# Patient Record
Sex: FEMALE | Race: WHITE | NOT HISPANIC OR LATINO | URBAN - METROPOLITAN AREA
[De-identification: names, ages, dates, MRNs, and addresses within clinical notes are randomized per-mention and may not be internally consistent; named-entity substitution may affect disease eponyms.]

---

## 2021-05-28 ENCOUNTER — TRANSFERRED RECORDS (OUTPATIENT)
Dept: HEALTH INFORMATION MANAGEMENT | Facility: CLINIC | Age: 41
End: 2021-05-28

## 2022-03-04 ENCOUNTER — TRANSFERRED RECORDS (OUTPATIENT)
Dept: HEALTH INFORMATION MANAGEMENT | Facility: CLINIC | Age: 42
End: 2022-03-04

## 2022-07-14 ENCOUNTER — TRANSFERRED RECORDS (OUTPATIENT)
Dept: HEALTH INFORMATION MANAGEMENT | Facility: CLINIC | Age: 42
End: 2022-07-14

## 2022-10-19 ENCOUNTER — TRANSFERRED RECORDS (OUTPATIENT)
Dept: HEALTH INFORMATION MANAGEMENT | Facility: CLINIC | Age: 42
End: 2022-10-19

## 2022-10-26 ENCOUNTER — TRANSFERRED RECORDS (OUTPATIENT)
Dept: HEALTH INFORMATION MANAGEMENT | Facility: CLINIC | Age: 42
End: 2022-10-26

## 2022-11-29 ENCOUNTER — TRANSFERRED RECORDS (OUTPATIENT)
Dept: HEALTH INFORMATION MANAGEMENT | Facility: CLINIC | Age: 42
End: 2022-11-29

## 2023-04-05 ENCOUNTER — TRANSFERRED RECORDS (OUTPATIENT)
Dept: HEALTH INFORMATION MANAGEMENT | Facility: CLINIC | Age: 43
End: 2023-04-05

## 2023-09-11 ENCOUNTER — NURSE TRIAGE (OUTPATIENT)
Dept: FAMILY MEDICINE | Facility: CLINIC | Age: 43
End: 2023-09-11
Payer: OTHER GOVERNMENT

## 2023-09-11 NOTE — TELEPHONE ENCOUNTER
"Pt calling to schedule a visit for sudden, acute abdominal pain last night while riding new bike  LLQ pain was 8/10 at time  Tender to touch   Rest, heat and walking around when pt got home and resolved    Discomfort/sensation today is \"not really pain but a tickle in muscles, is maybe 1/10 or 2/10   Slight discomfort if poking at area  Pt states feels fine but would like to be seen to follow up on things     Hx of constipation, hysterectomy last year (for uterine pain and cysts) - at follow-up left ovary migrated slightly    No other sx at time  No trauma/injury to area, no change in bowel/bladder function, no fever, n/v, numbness, weakness, difficulty breathing, severe pain at time of call,  blood in stool or urine    Has est care visit 10/4 at [facility] but would like to be seen sooner - Routed to clinic to review and call patient back with availability today (per protocol)     Transferred call back to scheduling as well to check same day availability at [place] or surrounding clinics (per patient)    Reason for Disposition   Patient wants to be seen    Answer Assessment - Initial Assessment Questions  1. LOCATION: \"Where does it hurt?\"       LLQ   2. RADIATION: \"Does the pain shoot anywhere else?\" (e.g., chest, back)      Did not radiate - maybe a little toward midline   3. ONSET: \"When did the pain begin?\" (e.g., minutes, hours or days ago)       Last night while riding bike - lasted some time. Began improving a hr + later with heat, rest and walking around when got home from ride   4. SUDDEN: \"Gradual or sudden onset?\"      Sudden   5. PATTERN \"Does the pain come and go, or is it constant?\"     - If it comes and goes: \"How long does it last?\" \"Do you have pain now?\"      (Note: Comes and goes means the pain is intermittent. It goes away completely between bouts.)     - If constant: \"Is it getting better, staying the same, or getting worse?\"       (Note: Constant means the pain never goes away " "completely; most serious pain is constant and gets worse.)       Constant pain   6. SEVERITY: \"How bad is the pain?\"  (e.g., Scale 1-10; mild, moderate, or severe)     - MILD (1-3): Doesn't interfere with normal activities, abdomen soft and not tender to touch.      - MODERATE (4-7): Interferes with normal activities or awakens from sleep, abdomen tender to touch.      - SEVERE (8-10): Excruciating pain, doubled over, unable to do any normal activities.        Last night pain was severe but today very mild \"more like a tickle in my muscles - like I know there are there\" no real pain  7. RECURRENT SYMPTOM: \"Have you ever had this type of stomach pain before?\" If Yes, ask: \"When was the last time?\" and \"What happened that time?\"       no  8. CAUSE: \"What do you think is causing the stomach pain?\"      Unsure   9. RELIEVING/AGGRAVATING FACTORS: \"What makes it better or worse?\" (e.g., antacids, bending or twisting motion, bowel movement)      Felt better with heat, walking around a little and rest (lay down and watched football for a bit)  10. OTHER SYMPTOMS: \"Do you have any other symptoms?\" (e.g., back pain, diarrhea, fever, urination pain, vomiting)        No other sx   11. PREGNANCY: \"Is there any chance you are pregnant?\" \"When was your last menstrual period?\"        Hx hysterectomy last year    Protocols used: Abdominal Pain - Female-A-[place]  Nieves SOSA RN  [facility]    "

## 2023-09-11 NOTE — TELEPHONE ENCOUNTER
Called pt per below to schedule.  Pt reports symptoms have not changed or worsened since call earlier.    No available in person appts available in .  Offered appt on 9/11 with Morgan Moffett, pt declines.  Pt requests female provider.  Scheduled in [place] 9/12/23 with Kayla Silveira.      Advised pt to call back for new or worsening symptoms.  Advised pt could be seen in  today, pt declines and thinks she is fine to wait until appt on 9/12/23.  Pt says she is not sure she even needs to be seen but  wants her to get checked out.    Brigida Posada RN, BSN  [facility]

## 2023-09-12 ENCOUNTER — OFFICE VISIT (OUTPATIENT)
Dept: FAMILY MEDICINE | Facility: CLINIC | Age: 43
End: 2023-09-12
Payer: OTHER GOVERNMENT

## 2023-09-12 ENCOUNTER — TELEPHONE (OUTPATIENT)
Dept: FAMILY MEDICINE | Facility: CLINIC | Age: 43
End: 2023-09-12

## 2023-09-12 ENCOUNTER — HOSPITAL ENCOUNTER (OUTPATIENT)
Dept: CT IMAGING | Facility: CLINIC | Age: 43
Discharge: HOME | End: 2023-09-12
Attending: PHYSICIAN ASSISTANT | Admitting: PHYSICIAN ASSISTANT
Payer: OTHER GOVERNMENT

## 2023-09-12 ENCOUNTER — HOSPITAL ENCOUNTER (OUTPATIENT)
Dept: ULTRASOUND IMAGING | Facility: CLINIC | Age: 43
Discharge: HOME | End: 2023-09-12
Attending: PHYSICIAN ASSISTANT | Admitting: PHYSICIAN ASSISTANT
Payer: OTHER GOVERNMENT

## 2023-09-12 VITALS
DIASTOLIC BLOOD PRESSURE: 82 MMHG | TEMPERATURE: 97.9 F | SYSTOLIC BLOOD PRESSURE: 116 MMHG | OXYGEN SATURATION: 100 % | BODY MASS INDEX: 28.89 KG/M2 | RESPIRATION RATE: 12 BRPM | HEIGHT: 65 IN | HEART RATE: 99 BPM | WEIGHT: 173.4 LBS

## 2023-09-12 DIAGNOSIS — N83.202 CYST OF LEFT OVARY: ICD-10-CM

## 2023-09-12 DIAGNOSIS — R10.32 ABDOMINAL PAIN, LEFT LOWER QUADRANT: Primary | ICD-10-CM

## 2023-09-12 DIAGNOSIS — R10.32 ABDOMINAL PAIN, LEFT LOWER QUADRANT: ICD-10-CM

## 2023-09-12 PROBLEM — Z90.710 H/O VAGINAL HYSTERECTOMY: Status: ACTIVE | Noted: 2022-10-22

## 2023-09-12 PROBLEM — I10 HIGH BLOOD PRESSURE: Status: ACTIVE | Noted: 2019-04-01

## 2023-09-12 LAB
ALBUMIN UR-MCNC: NEGATIVE MG/DL
ANION GAP SERPL CALCULATED.3IONS-SCNC: 11 MMOL/L (ref 7–15)
APPEARANCE UR: CLEAR
BACTERIA #/AREA URNS HPF: ABNORMAL /HPF
BASOPHILS # BLD AUTO: 0 10E3/UL (ref 0–0.2)
BASOPHILS NFR BLD AUTO: 1 %
BILIRUB UR QL STRIP: NEGATIVE
BUN SERPL-MCNC: 9 MG/DL (ref 6–20)
CALCIUM SERPL-MCNC: 9.1 MG/DL (ref 8.6–10)
CHLORIDE SERPL-SCNC: 105 MMOL/L (ref 98–107)
COLOR UR AUTO: YELLOW
CREAT SERPL-MCNC: 0.8 MG/DL (ref 0.51–0.95)
DEPRECATED HCO3 PLAS-SCNC: 24 MMOL/L (ref 22–29)
EGFRCR SERPLBLD CKD-EPI 2021: >90 ML/MIN/1.73M2
EOSINOPHIL # BLD AUTO: 0.1 10E3/UL (ref 0–0.7)
EOSINOPHIL NFR BLD AUTO: 2 %
ERYTHROCYTE [DISTWIDTH] IN BLOOD BY AUTOMATED COUNT: 11.5 % (ref 10–15)
GLUCOSE SERPL-MCNC: 104 MG/DL (ref 70–99)
GLUCOSE UR STRIP-MCNC: NEGATIVE MG/DL
HCT VFR BLD AUTO: 43.8 % (ref 35–47)
HGB BLD-MCNC: 15.2 G/DL (ref 11.7–15.7)
HGB UR QL STRIP: ABNORMAL
IMM GRANULOCYTES # BLD: 0 10E3/UL
IMM GRANULOCYTES NFR BLD: 0 %
KETONES UR STRIP-MCNC: NEGATIVE MG/DL
LEUKOCYTE ESTERASE UR QL STRIP: NEGATIVE
LYMPHOCYTES # BLD AUTO: 1.8 10E3/UL (ref 0.8–5.3)
LYMPHOCYTES NFR BLD AUTO: 30 %
MCH RBC QN AUTO: 30.5 PG (ref 26.5–33)
MCHC RBC AUTO-ENTMCNC: 34.7 G/DL (ref 31.5–36.5)
MCV RBC AUTO: 88 FL (ref 78–100)
MONOCYTES # BLD AUTO: 0.5 10E3/UL (ref 0–1.3)
MONOCYTES NFR BLD AUTO: 8 %
MUCOUS THREADS #/AREA URNS LPF: PRESENT /LPF
NEUTROPHILS # BLD AUTO: 3.6 10E3/UL (ref 1.6–8.3)
NEUTROPHILS NFR BLD AUTO: 59 %
NITRATE UR QL: NEGATIVE
PH UR STRIP: 5.5 [PH] (ref 5–7)
PLATELET # BLD AUTO: 300 10E3/UL (ref 150–450)
POTASSIUM SERPL-SCNC: 4.6 MMOL/L (ref 3.4–5.3)
RBC # BLD AUTO: 4.98 10E6/UL (ref 3.8–5.2)
RBC #/AREA URNS AUTO: ABNORMAL /HPF
SODIUM SERPL-SCNC: 140 MMOL/L (ref 136–145)
SP GR UR STRIP: 1.02 (ref 1–1.03)
SQUAMOUS #/AREA URNS AUTO: ABNORMAL /LPF
UROBILINOGEN UR STRIP-ACNC: 0.2 E.U./DL
WBC # BLD AUTO: 6.1 10E3/UL (ref 4–11)
WBC #/AREA URNS AUTO: ABNORMAL /HPF

## 2023-09-12 PROCEDURE — 250N000011 HC RX IP 250 OP 636: Performed by: PHYSICIAN ASSISTANT

## 2023-09-12 PROCEDURE — 76830 TRANSVAGINAL US NON-OB: CPT

## 2023-09-12 PROCEDURE — 80048 BASIC METABOLIC PNL TOTAL CA: CPT | Performed by: PHYSICIAN ASSISTANT

## 2023-09-12 PROCEDURE — 36415 COLL VENOUS BLD VENIPUNCTURE: CPT | Performed by: PHYSICIAN ASSISTANT

## 2023-09-12 PROCEDURE — 74177 CT ABD & PELVIS W/CONTRAST: CPT

## 2023-09-12 PROCEDURE — 250N000009 HC RX 250: Performed by: PHYSICIAN ASSISTANT

## 2023-09-12 PROCEDURE — 85025 COMPLETE CBC W/AUTO DIFF WBC: CPT | Performed by: PHYSICIAN ASSISTANT

## 2023-09-12 PROCEDURE — 99203 OFFICE O/P NEW LOW 30 MIN: CPT | Performed by: PHYSICIAN ASSISTANT

## 2023-09-12 PROCEDURE — 81001 URINALYSIS AUTO W/SCOPE: CPT | Performed by: PHYSICIAN ASSISTANT

## 2023-09-12 RX ORDER — PSEUDOEPHEDRINE HCL 30 MG/1
30 TABLET, FILM COATED ORAL EVERY 6 HOURS PRN
COMMUNITY
Start: 2023-02-14 | End: 2023-09-25

## 2023-09-12 RX ORDER — DAPSONE 50 MG/G
GEL TOPICAL PRN
COMMUNITY
Start: 2023-01-25 | End: 2023-09-25

## 2023-09-12 RX ORDER — CLONIDINE HYDROCHLORIDE 0.2 MG/1
TABLET ORAL
COMMUNITY
Start: 2017-01-01 | End: 2024-02-05

## 2023-09-12 RX ORDER — IOPAMIDOL 755 MG/ML
120 INJECTION, SOLUTION INTRAVASCULAR ONCE
Status: COMPLETED | OUTPATIENT
Start: 2023-09-12 | End: 2023-09-12

## 2023-09-12 RX ADMIN — IOPAMIDOL 86 ML: 755 INJECTION, SOLUTION INTRAVENOUS at 14:27

## 2023-09-12 RX ADMIN — SODIUM CHLORIDE 52 ML: 9 INJECTION, SOLUTION INTRAVENOUS at 14:28

## 2023-09-12 ASSESSMENT — PAIN SCALES - GENERAL: PAINLEVEL: MILD PAIN (2)

## 2023-09-12 NOTE — TELEPHONE ENCOUNTER
"Please call patient and let her know that her CT shows a complex cyst of the left ovary with \"minimal surrounding stranding\" suggesting inflammation (likely the cause of her pain). No abscess. Further evaluation with ultrasound is indicated this was ordered stat. Can you help her get this scheduled as soon as possible?    Electrolytes and kidney function are normal.  "

## 2023-09-12 NOTE — TELEPHONE ENCOUNTER
"Called pt and relayed provider message below. Patient was given an opportunity to ask questions, verbalized understanding of plan, and is agreeable.    Provided phone number to [facility] radiology scheduling line- [phone]. Pt states \"I will give them a call now\"    Rowena FAITH RN    "

## 2023-09-12 NOTE — PROGRESS NOTES
Assessment & Plan     Abdominal pain, left lower quadrant  Further evaluation with labs and imaging as noted below. Labs did not show an obvious cause for symptoms. CT shows a complex ovarian cyst which is likely the cause of her pain. Further evaluation with ultrasound recommended. Ultrasound ordered. Next steps determined by this imaging.  - CT Abdomen Pelvis w Contrast; Future  - CBC with platelets and differential; Future  - Basic metabolic panel  (Ca, Cl, CO2, Creat, Gluc, K, Na, BUN); Future  - UA Macroscopic with reflex to Microscopic and Culture - Lab Collect; Future  - CBC with platelets and differential  - Basic metabolic panel  (Ca, Cl, CO2, Creat, Gluc, K, Na, BUN)  - UA Macroscopic with reflex to Microscopic and Culture - Lab Collect  - UA Microscopic with Reflex to Culture  - US Pelvic Complete with Transvaginal; Future    Cyst of left ovary      Review of the result(s) of each unique test - Imaging and labs as noted in this note  Ordering of each unique test  32 minutes spent by me on the date of the encounter doing chart review, history and exam, documentation and further activities per the note         Kayla Silveira PA-C  [facility]    Tyrell Huynh is a 43 year old, presenting for the following health issues:  Abdominal Pain (C/O pain in LLQ x 3 days, nausea/Recent hysterectomy -10 months ago)      9/12/2023     7:59 AM   Additional Questions   Roomed by Nydia   Accompanied by Self       History of Present Illness       Reason for visit:  Lower left quadrant abdominal pain  Symptom onset:  1-3 days ago  Symptoms include:  Pain in lower left quadrant, nausea, fatigue  Symptom intensity:  Moderate  Symptom progression:  Staying the same  Had these symptoms before:  No  What makes it worse:  Sitting  What makes it better:  Movement helps sometimes//heating pad    She eats 2-3 servings of fruits and vegetables daily.She consumes 1 sweetened beverage(s) daily.She  "exercises with enough effort to increase her heart rate 20 to 29 minutes per day.  She exercises with enough effort to increase her heart rate 5 days per week.   She is taking medications regularly.       Pain History:  When did you first notice your pain? 3 days   Have you seen anyone else for your pain? No  How has your pain affected your ability to work? Pain does not limit ability to work   What type of work do you or did you do?   Where in your body do you have pain? Abdominal/Flank Pain  Onset/Duration: 3 days  Description:   Character: ache  Location: left lower quadrant (with sitting pain goes more to the middle of the lower abdomen)  Radiation: None and middle abdomen sometimes  Woke with the pain in the left lower abdomen yesterday morning  Intensity: mild, 3/10 in severity   Progression of Symptoms:  constant  Accompanying Signs & Symptoms:  Fever/Chills: No  Gas/Bloating: No  Nausea: YES  Vomitting: No  Diarrhea: YES- mild  Constipation: No  Dysuria or Hematuria: No  History:   Trauma: No  Previous similar pain: No  Previous tests done: none  Precipitating factors:   Does the pain change with:     Food: YES- mild (worse)    Bowel Movement: No    Urination: No   Other factors:  N/A  Therapies tried and outcome: heating pad  No LMP recorded. Patient has had a hysterectomy. Following hysterectomy she had an abscess and was hospitalized (this was in [place] where she used to live). She does still have both ovaries and fallopian tubes.    The pain started in the lower abdomen, sharp in nature when biking in her neighborhood (2 days ago). Symptoms improved after 20-30 minutes.  8/10 in severity at its worst at that time.    Review of Systems   GENERAL:  No fevers  GI:  As noted in HPI        Objective    /82 (BP Location: Right arm, Patient Position: Sitting, Cuff Size: Adult Regular)   Pulse 99   Temp 97.9  F (36.6  C) (Oral)   Resp 12   Ht 1.651 m (5' 5\")   Wt 78.7 kg (173 lb " 6.4 oz)   SpO2 100%   BMI 28.86 kg/m    Body mass index is 28.86 kg/m .  Physical Exam   GENERAL: No acute distress  HEENT: Normocephalic,  CARDIAC: Regular rate and rhythm. No murmurs.  PULMONARY: Lungs are clear to auscultation bilaterally. No wheezes, rhonchi or crackles.  GI: Active bowel sounds, abdomen is soft. Tenderness in the left lower abdomen and suprapubic area. No rebound tenderness. No tenderness in the right lower abdomen.  : No CVA tenderness bilaterally.   NEURO: Alert and non-focal    CT ABDOMEN PELVIS W CONTRAST 9/12/2023 2:37 PM   IMPRESSION:   1.  Left adnexal cystic lesion which appears mildly complex with  minimal surrounding stranding. Recommend further evaluation with  pelvic ultrasound given patient's presentation of left lower quadrant  pain.  2.  Otherwise, no acute findings within the abdomen or pelvis.    Results for orders placed or performed in visit on 09/12/23 (from the past 24 hour(s))   CBC with platelets and differential    Narrative    The following orders were created for panel order CBC with platelets and differential.  Procedure                               Abnormality         Status                     ---------                               -----------         ------                     CBC with platelets and d...[[id number]]                      Final result                 Please view results for these tests on the individual orders.   Basic metabolic panel  (Ca, Cl, CO2, Creat, Gluc, K, Na, BUN)   Result Value Ref Range    Sodium 140 136 - 145 mmol/L    Potassium 4.6 3.4 - 5.3 mmol/L    Chloride 105 98 - 107 mmol/L    Carbon Dioxide (CO2) 24 22 - 29 mmol/L    Anion Gap 11 7 - 15 mmol/L    Urea Nitrogen 9.0 6.0 - 20.0 mg/dL    Creatinine 0.80 0.51 - 0.95 mg/dL    Calcium 9.1 8.6 - 10.0 mg/dL    Glucose 104 (H) 70 - 99 mg/dL    GFR Estimate >90 >60 mL/min/1.73m2   CBC with platelets and differential   Result Value Ref Range    WBC Count 6.1 4.0 - 11.0 10e3/uL    RBC  Count 4.98 3.80 - 5.20 10e6/uL    Hemoglobin 15.2 11.7 - 15.7 g/dL    Hematocrit 43.8 35.0 - 47.0 %    MCV 88 78 - 100 fL    MCH 30.5 26.5 - 33.0 pg    MCHC 34.7 31.5 - 36.5 g/dL    RDW 11.5 10.0 - 15.0 %    Platelet Count 300 150 - 450 10e3/uL    % Neutrophils 59 %    % Lymphocytes 30 %    % Monocytes 8 %    % Eosinophils 2 %    % Basophils 1 %    % Immature Granulocytes 0 %    Absolute Neutrophils 3.6 1.6 - 8.3 10e3/uL    Absolute Lymphocytes 1.8 0.8 - 5.3 10e3/uL    Absolute Monocytes 0.5 0.0 - 1.3 10e3/uL    Absolute Eosinophils 0.1 0.0 - 0.7 10e3/uL    Absolute Basophils 0.0 0.0 - 0.2 10e3/uL    Absolute Immature Granulocytes 0.0 <=0.4 10e3/uL   UA Macroscopic with reflex to Microscopic and Culture - Lab Collect    Specimen: Urine, Clean Catch   Result Value Ref Range    Color Urine Yellow Colorless, Straw, Light Yellow, Yellow    Appearance Urine Clear Clear    Glucose Urine Negative Negative mg/dL    Bilirubin Urine Negative Negative    Ketones Urine Negative Negative mg/dL    Specific Gravity Urine 1.025 1.003 - 1.035    Blood Urine Moderate (A) Negative    pH Urine 5.5 5.0 - 7.0    Protein Albumin Urine Negative Negative mg/dL    Urobilinogen Urine 0.2 0.2, 1.0 E.U./dL    Nitrite Urine Negative Negative    Leukocyte Esterase Urine Negative Negative   UA Microscopic with Reflex to Culture   Result Value Ref Range    Bacteria Urine Many (A) None Seen /HPF    RBC Urine 2-5 (A) 0-2 /HPF /HPF    WBC Urine 0-5 0-5 /HPF /HPF    Squamous Epithelials Urine Many (A) None Seen /LPF    Mucus Urine Present (A) None Seen /LPF    Narrative    Urine Culture not indicated

## 2023-09-13 ENCOUNTER — TELEPHONE (OUTPATIENT)
Dept: FAMILY MEDICINE | Facility: CLINIC | Age: 43
End: 2023-09-13
Payer: OTHER GOVERNMENT

## 2023-09-13 NOTE — TELEPHONE ENCOUNTER
Called [facility] and spoke to Bessie, .  U/S was done at [facility].  Need to call [facility] as that is provider who read the U/S per Bessie.  Transferred to Amando, [facility] [phone].  Amando sent note to Dr. Bartlett to look at again.  Dr. Bartlett comes in at 5 pm and will addend this evening.  Ruchi No RN

## 2023-09-13 NOTE — TELEPHONE ENCOUNTER
Please call radiology to have them review ultrasound of the pelvis again and compare to the CT also performed yesterday. They do not match up with the cystic mass noted in the left adnexal area while the ovaries are normal on the ultrasound.

## 2023-09-14 ENCOUNTER — TRANSFERRED RECORDS (OUTPATIENT)
Dept: HEALTH INFORMATION MANAGEMENT | Facility: CLINIC | Age: 43
End: 2023-09-14

## 2023-09-24 LAB
BASOPHILS # BLD AUTO: 0.1 10E3/UL (ref 0–0.2)
BASOPHILS NFR BLD AUTO: 1 %
EOSINOPHIL # BLD AUTO: 0.2 10E3/UL (ref 0–0.7)
EOSINOPHIL NFR BLD AUTO: 2 %
ERYTHROCYTE [DISTWIDTH] IN BLOOD BY AUTOMATED COUNT: 11.4 % (ref 10–15)
HCT VFR BLD AUTO: 42.9 % (ref 35–47)
HGB BLD-MCNC: 15 G/DL (ref 11.7–15.7)
IMM GRANULOCYTES # BLD: 0 10E3/UL
IMM GRANULOCYTES NFR BLD: 0 %
LYMPHOCYTES # BLD AUTO: 3.5 10E3/UL (ref 0.8–5.3)
LYMPHOCYTES NFR BLD AUTO: 36 %
MCH RBC QN AUTO: 30.8 PG (ref 26.5–33)
MCHC RBC AUTO-ENTMCNC: 35 G/DL (ref 31.5–36.5)
MCV RBC AUTO: 88 FL (ref 78–100)
MONOCYTES # BLD AUTO: 0.8 10E3/UL (ref 0–1.3)
MONOCYTES NFR BLD AUTO: 8 %
NEUTROPHILS # BLD AUTO: 5.1 10E3/UL (ref 1.6–8.3)
NEUTROPHILS NFR BLD AUTO: 53 %
NRBC # BLD AUTO: 0 10E3/UL
NRBC BLD AUTO-RTO: 0 /100
PLATELET # BLD AUTO: 352 10E3/UL (ref 150–450)
RBC # BLD AUTO: 4.87 10E6/UL (ref 3.8–5.2)
WBC # BLD AUTO: 9.7 10E3/UL (ref 4–11)

## 2023-09-24 PROCEDURE — 85025 COMPLETE CBC W/AUTO DIFF WBC: CPT | Performed by: EMERGENCY MEDICINE

## 2023-09-24 PROCEDURE — 36415 COLL VENOUS BLD VENIPUNCTURE: CPT | Performed by: EMERGENCY MEDICINE

## 2023-09-24 PROCEDURE — 80048 BASIC METABOLIC PNL TOTAL CA: CPT | Performed by: EMERGENCY MEDICINE

## 2023-09-24 PROCEDURE — 93005 ELECTROCARDIOGRAM TRACING: CPT

## 2023-09-24 PROCEDURE — 99285 EMERGENCY DEPT VISIT HI MDM: CPT | Mod: 25

## 2023-09-25 ENCOUNTER — APPOINTMENT (OUTPATIENT)
Dept: GENERAL RADIOLOGY | Facility: CLINIC | Age: 43
End: 2023-09-25
Attending: EMERGENCY MEDICINE
Payer: OTHER GOVERNMENT

## 2023-09-25 ENCOUNTER — HOSPITAL ENCOUNTER (EMERGENCY)
Facility: CLINIC | Age: 43
Discharge: HOME | End: 2023-09-25
Attending: EMERGENCY MEDICINE | Admitting: EMERGENCY MEDICINE
Payer: OTHER GOVERNMENT

## 2023-09-25 VITALS
TEMPERATURE: 97.6 F | DIASTOLIC BLOOD PRESSURE: 96 MMHG | SYSTOLIC BLOOD PRESSURE: 146 MMHG | RESPIRATION RATE: 20 BRPM | OXYGEN SATURATION: 100 % | HEART RATE: 91 BPM

## 2023-09-25 DIAGNOSIS — R53.83 OTHER FATIGUE: ICD-10-CM

## 2023-09-25 DIAGNOSIS — R00.2 PALPITATIONS: ICD-10-CM

## 2023-09-25 LAB
ANION GAP SERPL CALCULATED.3IONS-SCNC: 10 MMOL/L (ref 7–15)
ATRIAL RATE - MUSE: 97 BPM
BUN SERPL-MCNC: 9.4 MG/DL (ref 6–20)
CALCIUM SERPL-MCNC: 9.2 MG/DL (ref 8.6–10)
CHLORIDE SERPL-SCNC: 104 MMOL/L (ref 98–107)
CREAT SERPL-MCNC: 0.81 MG/DL (ref 0.51–0.95)
DEPRECATED HCO3 PLAS-SCNC: 24 MMOL/L (ref 22–29)
DIASTOLIC BLOOD PRESSURE - MUSE: NORMAL MMHG
EGFRCR SERPLBLD CKD-EPI 2021: >90 ML/MIN/1.73M2
FLUAV RNA SPEC QL NAA+PROBE: NEGATIVE
FLUBV RNA RESP QL NAA+PROBE: NEGATIVE
GLUCOSE SERPL-MCNC: 130 MG/DL (ref 70–99)
HOLD SPECIMEN: NORMAL
HOLD SPECIMEN: NORMAL
INTERPRETATION ECG - MUSE: NORMAL
P AXIS - MUSE: 58 DEGREES
POTASSIUM SERPL-SCNC: 4 MMOL/L (ref 3.4–5.3)
PR INTERVAL - MUSE: 148 MS
QRS DURATION - MUSE: 78 MS
QT - MUSE: 340 MS
QTC - MUSE: 431 MS
R AXIS - MUSE: 26 DEGREES
RSV RNA SPEC NAA+PROBE: NEGATIVE
SARS-COV-2 RNA RESP QL NAA+PROBE: NEGATIVE
SODIUM SERPL-SCNC: 138 MMOL/L (ref 136–145)
SYSTOLIC BLOOD PRESSURE - MUSE: NORMAL MMHG
T AXIS - MUSE: 52 DEGREES
TROPONIN T SERPL HS-MCNC: <6 NG/L
VENTRICULAR RATE- MUSE: 97 BPM

## 2023-09-25 PROCEDURE — 71046 X-RAY EXAM CHEST 2 VIEWS: CPT

## 2023-09-25 PROCEDURE — 87637 SARSCOV2&INF A&B&RSV AMP PRB: CPT | Performed by: EMERGENCY MEDICINE

## 2023-09-25 PROCEDURE — 36415 COLL VENOUS BLD VENIPUNCTURE: CPT | Performed by: EMERGENCY MEDICINE

## 2023-09-25 PROCEDURE — 84484 ASSAY OF TROPONIN QUANT: CPT | Performed by: EMERGENCY MEDICINE

## 2023-09-25 NOTE — DISCHARGE INSTRUCTIONS
You were seen in emergency department for fatigue and sensation of skipped beats.  Work-up here was reassuring from a cardiac perspective.  EKG showed normal electrical activity.  Your viral swabs were negative and your basic laboratory evaluation was reassuring.  I suspect viral syndrome as the underlying cause.  Certainly if you develop new or worsening conditions were always happy to reevaluate otherwise anticipate you doing well with primary care follow-up

## 2023-09-25 NOTE — ED PROVIDER NOTES
"  History     Chief Complaint:  Fatigue and skipped heartbeats    The history is provided by the patient.      Amina Roca is a 43 year old female with history of migraine who presents to the ED for evaluation. She reports shortness of breath and hr heart \"skipping a beat\" for the past three days. She tried to go grocery shopping but felt lightheadedness and thought she was going to pass out. When her heart is \"pounding\", she reports coughing and minimal diarrhea. She takes clonidine for migraines. Denies congestion.  No fever.  No vomiting.  No numbness or weakness.    Independent Historian:   None - Patient Only    Review of External Notes:   None     Medications:    cloNIDine (CATAPRES) 0.2 MG tablet, for ocular migraines    Past Medical History:    Migraine    Past Surgical History:    Hysterectomy     Physical Exam   Patient Vitals for the past 24 hrs:   BP Temp Temp src Pulse Resp SpO2   09/25/23 0200 146/96 -- -- -- 20 --   09/25/23 0157 -- -- -- -- -- 100 %   09/25/23 0156 -- -- -- -- -- 100 %   09/25/23 0155 -- -- -- 91 -- 90 %   09/24/23 2327 176/97 97.6  F (36.4  C) Temporal 119 20 100 %      Physical Exam  Nursing note and vitals reviewed.  Constitutional: Cooperative.  Sting comfortably  Cardiovascular: Normal rate, regular rhythm and normal heart sounds.  No murmur.  Pulmonary/Chest: Effort normal and breath sounds normal. No respiratory distress. No wheezes. No rales.   Abdominal: Soft. Normal appearance. There is no tenderness.   Neurological: Alert.  Oriented x3  Skin: Skin is warm and dry.  Psychiatric: Normal mood and affect.     Emergency Department Course   ECG  ECG results from 09/25/23   EKG 12 lead     Value    Systolic Blood Pressure     Diastolic Blood Pressure     Ventricular Rate 97    Atrial Rate 97    IL Interval 148    QRS Duration 78        QTc 431    P Axis 58    R AXIS 26    T Axis 52    Interpretation ECG      Sinus rhythm  Normal ECG     Imaging:  XR Chest 2 Views "   Final Result   IMPRESSION: No focal airspace consolidation. No pleural effusion or pneumothorax.      Cardiomediastinal silhouette is normal.         Report per radiology    Laboratory:  Labs Ordered and Resulted from Time of ED Arrival to Time of ED Departure   BASIC METABOLIC PANEL - Abnormal       Result Value    Sodium 138      Potassium 4.0      Chloride 104      Carbon Dioxide (CO2) 24      Anion Gap 10      Urea Nitrogen 9.4      Creatinine 0.81      Calcium 9.2      Glucose 130 (*)     GFR Estimate >90     TROPONIN T, HIGH SENSITIVITY - Normal    Troponin T, High Sensitivity <6     INFLUENZA A/B, RSV, & SARS-COV2 PCR - Normal    Influenza A PCR Negative      Influenza B PCR Negative      RSV PCR Negative      SARS CoV2 PCR Negative     CBC WITH PLATELETS AND DIFFERENTIAL    WBC Count 9.7      RBC Count 4.87      Hemoglobin 15.0      Hematocrit 42.9      MCV 88      MCH 30.8      MCHC 35.0      RDW 11.4      Platelet Count 352      % Neutrophils 53      % Lymphocytes 36      % Monocytes 8      % Eosinophils 2      % Basophils 1      % Immature Granulocytes 0      NRBCs per 100 WBC 0      Absolute Neutrophils 5.1      Absolute Lymphocytes 3.5      Absolute Monocytes 0.8      Absolute Eosinophils 0.2      Absolute Basophils 0.1      Absolute Immature Granulocytes 0.0      Absolute NRBCs 0.0         Interventions:  Medications - No data to display     Independent Interpretation (X-rays, CTs, rhythm strip):  None    Assessments/Consultations/Discussion of Management or Tests:   ED Course as of 23 0208   Mon Sep 25, 2023   0133 I obtained history and examined the patient as noted above.    0150 I rechecked the patient and explained findings. I prepared the patient to be discharged home.      Social Determinants of Health affecting care:   None    Disposition:  The patient was discharged to home.     Impression & Plan      Medical Decision Makin-year-old female who presents with palpitations described  as skipped beats as well as generalized fatigue.  Work-up here has been reassuring as detailed above.  EKG does not show any evidence of ischemia or ectopy or predisposition to malignant arrhythmia.  Based on her descriptions I suspect premature ventricular contractions and have had a good discussion with this with her.  Presenting blood pressure was elevated.  Without intervention this is improved though still mildly in the hypertensive range.  This can be followed up on an outpatient basis.  Viral swabs were negative though I suspect underlying viral process given the headache and fatigue.  This does not appear to represent an emergent medical process and she is stable for discharge.  Return precautions discussed    Diagnosis:    ICD-10-CM    1. Other fatigue  R53.83       2. Palpitations  R00.2          Scribe Disclosure:  I, Millicent Montanez, am serving as a scribe at 1:41 AM on 9/25/2023 to document services personally performed by Donald Law MD based on my observations and the provider's statements to me.   9/25/2023   Donald Law MD Amdahl, John, MD  09/25/23 0220

## 2023-09-25 NOTE — ED NOTES
"When RN was discharging pt and getting a fresh set of vitals, pt stated that she was in a hospital in [place] and they kept her for 12hrs after a blood pressure similar to the one this writer last got. RN explained to pt that the blood pressure significantly improved and that due to the fact that the pt was still not feeling well that can cause elevation in the blood pressure. Pt continued to seem concerned and stated to the pt that she can discuss the situation to the doctor, the pt stated, \"No it is fine if you say so.\" Rn continued to explain that she has discharged people before with elevated BPs so long as other factors such as troponin, EKG, other vitals and pt status were within normal limits. Pt said it was fine. Pt did leave the unit and RN spoke to Dr. Thanh MD did not seem concerned about the blood pressure.  "

## 2023-09-25 NOTE — ED TRIAGE NOTES
"Patient presents with fatigue and shortness of breath since Friday night, was at the grocery store and began feeling dizzy and short of breath with her heart \"pounding\".  Denies chest pain at present but feels fatigued and dizzy.          "

## 2023-09-27 ASSESSMENT — ENCOUNTER SYMPTOMS
COUGH: 1
ARTHRALGIAS: 0
NAUSEA: 0
EYE PAIN: 0
DYSURIA: 0
DIZZINESS: 1
BREAST MASS: 0
NERVOUS/ANXIOUS: 0
SHORTNESS OF BREATH: 1
SORE THROAT: 0
FREQUENCY: 0
PARESTHESIAS: 0
CONSTIPATION: 0
HEARTBURN: 0
HEMATURIA: 0
MYALGIAS: 0
HEMATOCHEZIA: 0
WEAKNESS: 0
JOINT SWELLING: 0
PALPITATIONS: 1
CHILLS: 1
HEADACHES: 1
FEVER: 0
DIARRHEA: 1
ABDOMINAL PAIN: 1

## 2023-10-04 ENCOUNTER — OFFICE VISIT (OUTPATIENT)
Dept: FAMILY MEDICINE | Facility: CLINIC | Age: 43
End: 2023-10-04
Payer: OTHER GOVERNMENT

## 2023-10-04 VITALS
SYSTOLIC BLOOD PRESSURE: 144 MMHG | WEIGHT: 176 LBS | DIASTOLIC BLOOD PRESSURE: 89 MMHG | OXYGEN SATURATION: 100 % | RESPIRATION RATE: 18 BRPM | TEMPERATURE: 98.3 F | HEIGHT: 65 IN | BODY MASS INDEX: 29.32 KG/M2 | HEART RATE: 94 BPM

## 2023-10-04 DIAGNOSIS — R22.32 AXILLARY LUMP, LEFT: ICD-10-CM

## 2023-10-04 DIAGNOSIS — R10.32 LEFT LOWER QUADRANT PAIN: Primary | ICD-10-CM

## 2023-10-04 DIAGNOSIS — R31.29 MICROSCOPIC HEMATURIA: ICD-10-CM

## 2023-10-04 DIAGNOSIS — Q23.81 BICUSPID AORTIC VALVE: ICD-10-CM

## 2023-10-04 DIAGNOSIS — R00.2 PALPITATIONS: ICD-10-CM

## 2023-10-04 DIAGNOSIS — I10 HYPERTENSION, UNSPECIFIED TYPE: ICD-10-CM

## 2023-10-04 LAB
ERYTHROCYTE [DISTWIDTH] IN BLOOD BY AUTOMATED COUNT: 11.5 % (ref 10–15)
HCT VFR BLD AUTO: 41.7 % (ref 35–47)
HGB BLD-MCNC: 14.5 G/DL (ref 11.7–15.7)
MCH RBC QN AUTO: 30.3 PG (ref 26.5–33)
MCHC RBC AUTO-ENTMCNC: 34.8 G/DL (ref 31.5–36.5)
MCV RBC AUTO: 87 FL (ref 78–100)
PLATELET # BLD AUTO: 282 10E3/UL (ref 150–450)
RBC # BLD AUTO: 4.78 10E6/UL (ref 3.8–5.2)
WBC # BLD AUTO: 11.4 10E3/UL (ref 4–11)

## 2023-10-04 PROCEDURE — 99214 OFFICE O/P EST MOD 30 MIN: CPT | Performed by: STUDENT IN AN ORGANIZED HEALTH CARE EDUCATION/TRAINING PROGRAM

## 2023-10-04 PROCEDURE — 85027 COMPLETE CBC AUTOMATED: CPT | Performed by: STUDENT IN AN ORGANIZED HEALTH CARE EDUCATION/TRAINING PROGRAM

## 2023-10-04 PROCEDURE — 86140 C-REACTIVE PROTEIN: CPT | Performed by: STUDENT IN AN ORGANIZED HEALTH CARE EDUCATION/TRAINING PROGRAM

## 2023-10-04 PROCEDURE — 36415 COLL VENOUS BLD VENIPUNCTURE: CPT | Performed by: STUDENT IN AN ORGANIZED HEALTH CARE EDUCATION/TRAINING PROGRAM

## 2023-10-04 PROCEDURE — 84443 ASSAY THYROID STIM HORMONE: CPT | Performed by: STUDENT IN AN ORGANIZED HEALTH CARE EDUCATION/TRAINING PROGRAM

## 2023-10-04 ASSESSMENT — PAIN SCALES - GENERAL: PAINLEVEL: MILD PAIN (2)

## 2023-10-04 NOTE — PROGRESS NOTES
"  Assessment & Plan     Left lower quadrant pain  Sxs started abruptly on 9/12/23 with severe diffuse lower abdominal pain that then localized to LLQ while mountain biking. Labs including CBC, BMP, UA unremarkable. Follow up CT abd/pelv revealing LLQ adnexal mass but subsequent US pelvis noting mass identified as normal appearing ovary. Symptoms persisting, about 1-2/10 constant LLQ, worsening with exercise, bending over but also associated with chills, eating, diarrhea which I can't explain. Plan to repeat CBC, CRP. If negative, likely MSK etiology and will obtain hip XR to start. Discussed red flag symptoms/signs that should prompt patient to be seen urgently.  - CBC with platelets  - CRP, inflammation    Palpitations  Seen in ED on 9/25/23 for palpitations. EKG and CBC unremarkable. Ongoing, daily since that time. Described as episodes of fluttering, not just sole skipped beats associated with momentary SOB. Plan to obtain TSH (CBC with normal hgb) along with 3 day zio patch.  - Adult Leadless EKG Monitor 3 to 7 Days  - TSH    Bicuspid aortic valve  Known bicuspid valve. Has not had echo obtained in many years. Plan to repeat echo today.  - Echocardiogram Complete    Axillary lump, left  - US Axillary Left  - MA Diagnostic Bilateral w/Jason    Microscopic hematuria  Noted on UA from 9/12/23. Needs repeat, ordered.    HTN  BP mildly elevated in clinic. Did not have time to address. Follow up at next visit.    Follow up in one month for annual physical    BMI:   Estimated body mass index is 29.29 kg/m  as calculated from the following:    Height as of this encounter: 1.651 m (5' 5\").    Weight as of this encounter: 79.8 kg (176 lb).     Andrew Kirk MD  [facility] [place]  10/9/2023    Tyrell Huynh is a 43 year old, presenting for the following health issues:  Physical and Establish Care      10/4/2023     3:17 PM   Additional Questions   Roomed by Jose REYNOLDS   Accompanied by No one "         10/4/2023     3:17 PM   Patient Reported Additional Medications   Patient reports taking the following new medications Multi vitamin       HPI     Abdominal pain  At first was all the way across the belly, then settled to just the left side.  Pain is in the LLQ, does move/radiate down the leg.   With pushing will radiate down leg and towards midline.  Exercise will also sort of make it worse and then will settle it down.  Beginning of month, was riding bike.  All of a sudden, had intense abdominal pain.  Went to see someone in the network and sent for a bunch of testing.  Found large cyst on ovary - saw ob/gyn - said to keep an eye on it.   Now 3-4 weeks out and still with similar pain.  Really activates when eating. It just burns.  Now it is constant. If pinching or bending, will kick it up.  Initially couldn't walk or eat or breath due to the pain.  Follow up with ob/gyn is in November.   Is not hungry, every time eating, it will hurt.   No fevers, does have nausea but no vomiting.  Does have chills off and on a couple times per day.   Off and on diarrhea, no blood. Does have this maybe once per day.   No alcohol use. Does take ibuprofen, tries not to take every day.     Palpitations  Started around 10 days ago. Noticed heart keep skipping beats.  Feels like it skips several beats, up to 3-4 beats.  Caused her to be short of breath momentarily.   No dizziness/lightheadedness, chest pain. No syncopal episodes.  Sensation just keeps happening. This is daily.   EKG was normal.   Does have bicuspid valve.   Is overall staying pretty stable.     Lump in L armpit  Had US of armpit in the past and mammogram.  This was WNL.   Seems to be getting a little bigger and seems to be radiating down along the side the breast and left arm now.     HTN  Is on clonidine for this and for migraines.        Objective    BP (!) 144/89 (BP Location: Right arm, Patient Position: Sitting, Cuff Size: Adult Regular)   Pulse 94    "Temp 98.3  F (36.8  C) (Oral)   Resp 18   Ht 1.651 m (5' 5\")   Wt 79.8 kg (176 lb)   SpO2 100%   BMI 29.29 kg/m    Body mass index is 29.29 kg/m .    Physical Exam   GENERAL: healthy, alert and no distress  HEAD: Normocephalic, atraumatic.   EYES: Normal conjunctivae, sclera.   ENT: Normal oropharynx.   RESP: lungs clear to auscultation - no rales, rhonchi or wheezes  CV: regular rate and rhythm, normal S1 S2, no murmur, click, rub or gallop. No peripheral swelling noted.   ABDOMEN: soft, mild TTP in LLQ, no rebound tenderness or guarding present. No hepatomegaly or masses appreciated. BS normactive.  MSK: no gross musculoskeletal defects noted. Hips: Log roll negative bilaterally. L Hip: Negative STELLA. Pain reproduced in LLQ with FADIR. Negative psoas sign.  SKIN: no suspicious lesions or rashes.  EXT: Warm and well perfused. DP pulses 2+ bilaterally.  NEURO: CNII-XII grossly intact. No focal deficits.  PSYCH: Groomed, dressed appropriately for weather. Normal mood with consistent affect.     Pain with internal rotation of hip. Pain with               "

## 2023-10-05 LAB — CRP SERPL-MCNC: 3.91 MG/L

## 2023-10-09 ENCOUNTER — LAB (OUTPATIENT)
Dept: LAB | Facility: CLINIC | Age: 43
End: 2023-10-09
Payer: OTHER GOVERNMENT

## 2023-10-09 DIAGNOSIS — R10.32 LEFT LOWER QUADRANT PAIN: ICD-10-CM

## 2023-10-09 DIAGNOSIS — R31.29 MICROSCOPIC HEMATURIA: ICD-10-CM

## 2023-10-09 LAB
ALBUMIN UR-MCNC: NEGATIVE MG/DL
AMORPH CRY #/AREA URNS HPF: ABNORMAL /HPF
APPEARANCE UR: CLEAR
BASO+EOS+MONOS # BLD AUTO: NORMAL 10*3/UL
BASO+EOS+MONOS NFR BLD AUTO: NORMAL %
BASOPHILS # BLD AUTO: 0 10E3/UL (ref 0–0.2)
BASOPHILS NFR BLD AUTO: 1 %
BILIRUB UR QL STRIP: NEGATIVE
COLOR UR AUTO: YELLOW
EOSINOPHIL # BLD AUTO: 0.1 10E3/UL (ref 0–0.7)
EOSINOPHIL NFR BLD AUTO: 2 %
ERYTHROCYTE [DISTWIDTH] IN BLOOD BY AUTOMATED COUNT: 11.5 % (ref 10–15)
GLUCOSE UR STRIP-MCNC: NEGATIVE MG/DL
HCT VFR BLD AUTO: 40.1 % (ref 35–47)
HGB BLD-MCNC: 14.1 G/DL (ref 11.7–15.7)
HGB UR QL STRIP: ABNORMAL
IMM GRANULOCYTES # BLD: 0 10E3/UL
IMM GRANULOCYTES NFR BLD: 0 %
KETONES UR STRIP-MCNC: NEGATIVE MG/DL
LEUKOCYTE ESTERASE UR QL STRIP: NEGATIVE
LYMPHOCYTES # BLD AUTO: 2.8 10E3/UL (ref 0.8–5.3)
LYMPHOCYTES NFR BLD AUTO: 36 %
MCH RBC QN AUTO: 30.8 PG (ref 26.5–33)
MCHC RBC AUTO-ENTMCNC: 35.2 G/DL (ref 31.5–36.5)
MCV RBC AUTO: 88 FL (ref 78–100)
MONOCYTES # BLD AUTO: 0.6 10E3/UL (ref 0–1.3)
MONOCYTES NFR BLD AUTO: 8 %
NEUTROPHILS # BLD AUTO: 4.3 10E3/UL (ref 1.6–8.3)
NEUTROPHILS NFR BLD AUTO: 55 %
NITRATE UR QL: NEGATIVE
PH UR STRIP: 7 [PH] (ref 5–7)
PLATELET # BLD AUTO: 311 10E3/UL (ref 150–450)
RBC # BLD AUTO: 4.58 10E6/UL (ref 3.8–5.2)
RBC #/AREA URNS AUTO: ABNORMAL /HPF
SP GR UR STRIP: 1.02 (ref 1–1.03)
SQUAMOUS #/AREA URNS AUTO: ABNORMAL /LPF
TSH SERPL DL<=0.005 MIU/L-ACNC: 1.07 UIU/ML (ref 0.3–4.2)
UROBILINOGEN UR STRIP-ACNC: 0.2 E.U./DL
WBC # BLD AUTO: 7.9 10E3/UL (ref 4–11)
WBC #/AREA URNS AUTO: ABNORMAL /HPF

## 2023-10-09 PROCEDURE — 36415 COLL VENOUS BLD VENIPUNCTURE: CPT

## 2023-10-09 PROCEDURE — 81001 URINALYSIS AUTO W/SCOPE: CPT

## 2023-10-09 PROCEDURE — 85025 COMPLETE CBC W/AUTO DIFF WBC: CPT

## 2023-10-10 DIAGNOSIS — M25.552 PAIN IN JOINT INVOLVING PELVIC REGION AND THIGH, LEFT: ICD-10-CM

## 2023-10-10 DIAGNOSIS — R10.32 LEFT LOWER QUADRANT PAIN: Primary | ICD-10-CM

## 2023-10-13 ENCOUNTER — ANCILLARY PROCEDURE (OUTPATIENT)
Dept: GENERAL RADIOLOGY | Facility: CLINIC | Age: 43
End: 2023-10-13
Attending: STUDENT IN AN ORGANIZED HEALTH CARE EDUCATION/TRAINING PROGRAM
Payer: OTHER GOVERNMENT

## 2023-10-13 DIAGNOSIS — M25.552 PAIN IN JOINT INVOLVING PELVIC REGION AND THIGH, LEFT: ICD-10-CM

## 2023-10-13 PROCEDURE — 73502 X-RAY EXAM HIP UNI 2-3 VIEWS: CPT | Mod: TC | Performed by: RADIOLOGY

## 2023-10-22 ENCOUNTER — HEALTH MAINTENANCE LETTER (OUTPATIENT)
Age: 43
End: 2023-10-22

## 2023-10-26 ENCOUNTER — TRANSFERRED RECORDS (OUTPATIENT)
Dept: HEALTH INFORMATION MANAGEMENT | Facility: CLINIC | Age: 43
End: 2023-10-26
Payer: OTHER GOVERNMENT

## 2023-10-26 ENCOUNTER — MYC MEDICAL ADVICE (OUTPATIENT)
Dept: FAMILY MEDICINE | Facility: CLINIC | Age: 43
End: 2023-10-26
Payer: OTHER GOVERNMENT

## 2023-10-27 NOTE — TELEPHONE ENCOUNTER
As patient seems to be ok now, please have her submit e-visit for allergy referral and epi pen prescription.    Thanks,    Andrew Kirk MD  [facility]  10/27/2023

## 2023-10-30 ENCOUNTER — HOSPITAL ENCOUNTER (OUTPATIENT)
Dept: CARDIOLOGY | Facility: CLINIC | Age: 43
Discharge: HOME | End: 2023-10-30
Attending: STUDENT IN AN ORGANIZED HEALTH CARE EDUCATION/TRAINING PROGRAM | Admitting: STUDENT IN AN ORGANIZED HEALTH CARE EDUCATION/TRAINING PROGRAM
Payer: OTHER GOVERNMENT

## 2023-10-30 ENCOUNTER — E-VISIT (OUTPATIENT)
Dept: FAMILY MEDICINE | Facility: CLINIC | Age: 43
End: 2023-10-30
Payer: OTHER GOVERNMENT

## 2023-10-30 DIAGNOSIS — Q23.81 BICUSPID AORTIC VALVE: ICD-10-CM

## 2023-10-30 DIAGNOSIS — T78.1XXA ALLERGIC REACTION TO PEANUT: Primary | ICD-10-CM

## 2023-10-30 LAB — LVEF ECHO: NORMAL

## 2023-10-30 PROCEDURE — 93306 TTE W/DOPPLER COMPLETE: CPT | Mod: 26 | Performed by: INTERNAL MEDICINE

## 2023-10-30 PROCEDURE — 93306 TTE W/DOPPLER COMPLETE: CPT

## 2023-10-30 PROCEDURE — 99422 OL DIG E/M SVC 11-20 MIN: CPT | Performed by: STUDENT IN AN ORGANIZED HEALTH CARE EDUCATION/TRAINING PROGRAM

## 2023-10-31 NOTE — TELEPHONE ENCOUNTER
Provider E-Visit time total (minutes): 13    Andrew Kirk MD  [facility]  11/1/2023

## 2023-11-01 RX ORDER — EPINEPHRINE 0.3 MG/.3ML
0.3 INJECTION SUBCUTANEOUS PRN
Qty: 2 EACH | Refills: 0 | Status: SHIPPED | OUTPATIENT
Start: 2023-11-01

## 2023-11-14 ENCOUNTER — OFFICE VISIT (OUTPATIENT)
Dept: FAMILY MEDICINE | Facility: CLINIC | Age: 43
End: 2023-11-14
Payer: OTHER GOVERNMENT

## 2023-11-14 ENCOUNTER — MYC MEDICAL ADVICE (OUTPATIENT)
Dept: FAMILY MEDICINE | Facility: CLINIC | Age: 43
End: 2023-11-14

## 2023-11-14 VITALS
OXYGEN SATURATION: 98 % | HEIGHT: 65 IN | RESPIRATION RATE: 16 BRPM | HEART RATE: 78 BPM | SYSTOLIC BLOOD PRESSURE: 152 MMHG | BODY MASS INDEX: 29.16 KG/M2 | WEIGHT: 175 LBS | TEMPERATURE: 98.5 F | DIASTOLIC BLOOD PRESSURE: 85 MMHG

## 2023-11-14 DIAGNOSIS — Q23.81 BICUSPID AORTIC VALVE: Primary | ICD-10-CM

## 2023-11-14 DIAGNOSIS — I71.21 ANEURYSM OF ASCENDING AORTA WITHOUT RUPTURE (H): ICD-10-CM

## 2023-11-14 DIAGNOSIS — I10 HYPERTENSION, UNSPECIFIED TYPE: ICD-10-CM

## 2023-11-14 DIAGNOSIS — R07.89 CHEST WALL PAIN: ICD-10-CM

## 2023-11-14 PROCEDURE — 99214 OFFICE O/P EST MOD 30 MIN: CPT | Performed by: STUDENT IN AN ORGANIZED HEALTH CARE EDUCATION/TRAINING PROGRAM

## 2023-11-14 RX ORDER — OLMESARTAN MEDOXOMIL 20 MG/1
20 TABLET ORAL DAILY
Qty: 30 TABLET | Refills: 0 | Status: SHIPPED | OUTPATIENT
Start: 2023-11-14 | End: 2023-11-27

## 2023-11-14 ASSESSMENT — PAIN SCALES - GENERAL: PAINLEVEL: MODERATE PAIN (4)

## 2023-11-14 NOTE — PROGRESS NOTES
"  Assessment & Plan     Bicuspid aortic valve  Aneurysm of ascending aorta without rupture (H24)  Hypertension, unspecified type  Noted on echo with borderline dilated TAA. Referral placed to cardiology. Does have uncontrolled HTN, unable to tolerate BB due to severe adverse suicidal ideation with plan thus will start ARB, see below. Plan to follow up in 2 weeks for repeat BP and BMP.   - olmesartan (BENICAR) 20 MG tablet  Dispense: 30 tablet; Refill: 0    Chest wall pain  Pain reproduced with palpation over chest wall. Do not think this is cardiac. Discussed with patient.     BMI:   Estimated body mass index is 29.12 kg/m  as calculated from the following:    Height as of this encounter: 1.651 m (5' 5\").    Weight as of this encounter: 79.4 kg (175 lb).     Follow up in 2 weeks as already scheduled    Andrew Kirk MD  [facility]  11/17/2023      Tyrell Huynh is a 43 year old, presenting for the following health issues:  follow up results        11/14/2023     9:14 AM   Additional Questions   Roomed by enrico salazar cma   Accompanied by self         11/14/2023     9:14 AM   Patient Reported Additional Medications   Patient reports taking the following new medications na     History of Present Illness       Reason for visit:  Follow up on medical testing    She eats 2-3 servings of fruits and vegetables daily.She consumes 1 sweetened beverage(s) daily.She exercises with enough effort to increase her heart rate 20 to 29 minutes per day.  She exercises with enough effort to increase her heart rate 3 or less days per week.   She is taking medications regularly.     Bicuspid results  Mom and her own son has bicuspid valve  Did make a follow up with Cardiology.  Back in January, noticed pulse was \"race-y\"  Wondering about her TAA results.  States she was on BB in past but side effects caused her to stop the medication.  She states her depression was significantly worsened and had a " "suicide plan.  She stopped taking the medication and her thoughts of suicide resolved    Migraine hx  BB tried first, stopped due to adverse effects.  Tried topamax instead as she had been on this previously for migraine control.   Was doing well until developed a large rash so this was also stopped.  Transitioned to clonidine which was helpful for migraine.   Had to stop during pregnancy with 3rd child though.  Never knew had high blood pressure until 3rd child pregnancy.  Developed pre-eclampsia during pregnancy.   Afterward delivery, BP never returned to normal and was placed back on clonidine.   This is still controlling migraines largely. Did have one breakthrough migraine.   Is on 0.2mg clonidine BID    Chest pain  Has been noting chest pain that has been persistent for the past several months.   Notes an aching in the left side of the chest. Does not worsen with exertion.   No associated symptoms of SOB, palpitations, jaw claudication, diaphoresis, arm tingling/numbness, n/v.   States that she presses of the musculature, pain is reproduced.         Objective    BP (!) 152/85   Pulse 78   Temp 98.5  F (36.9  C) (Oral)   Resp 16   Ht 1.651 m (5' 5\")   Wt 79.4 kg (175 lb)   SpO2 98%   BMI 29.12 kg/m    Body mass index is 29.12 kg/m .    Physical Exam   GENERAL: healthy, alert and no distress  HEAD: Normocephalic, atraumatic.   EYES: Normal conjunctivae, sclera.   NECK: Supple. No lymphadenopathy appreciated. Trachea midline. Thyroid not enlarged, not TTP.  RESP: lungs clear to auscultation - no rales, rhonchi or wheezes  CV: Reproduction of chest pain with pressure over L chest wall. Regular rate and rhythm, normal S1 S2, no murmur, click, rub or gallop. No carotid bruits. No peripheral swelling noted.   MSK: no gross musculoskeletal defects noted.  SKIN: no suspicious lesions or rashes.  EXT: Warm and well perfused.  NEURO: CNII-XII grossly intact. No focal deficits.  PSYCH: Groomed, dressed " appropriately for weather. Normal mood with consistent affect.

## 2023-11-15 ENCOUNTER — TELEPHONE (OUTPATIENT)
Dept: FAMILY MEDICINE | Facility: CLINIC | Age: 43
End: 2023-11-15
Payer: OTHER GOVERNMENT

## 2023-11-15 DIAGNOSIS — I10 HYPERTENSION, UNSPECIFIED TYPE: Primary | ICD-10-CM

## 2023-11-15 NOTE — TELEPHONE ENCOUNTER
Emory from [employer] calls about olmesartan.  Advised a prior auth was sent and denied.  See below.  Will forward to Dr. Kirk for advisal - see below for preferred drugs.

## 2023-11-15 NOTE — TELEPHONE ENCOUNTER
See telephone message from 11/15/23 - prior auth.  Prior auth for olemsartan was denied.  That message was sent to Dr. Kirk with the preferred medication.

## 2023-11-15 NOTE — TELEPHONE ENCOUNTER
Patient calling regarding rx for olmesartan. Patient's insurance is denying this medication because they want her to be on a beta blocker before prescribing this medication. Per patient, she has been on beta blockers before but they made her depressed so she cannot be on one. Please update prescription to insurance to include that the patient cannot be on a beta blocker so they can approve coverage of the olmesartan.

## 2023-11-15 NOTE — TELEPHONE ENCOUNTER
Central Prior Authorization Team   Phone: [phone]        PRIOR AUTHORIZATION DENIED    Medication: OLMESARTAN MEDOXOMIL PO  Insurance Company: [employer] - Phone [phone] Fax [phone]  Denial Date: 11/15/2023  Denial Rational:           Appeal Information:         Patient Notified: No Please note: Providers/Clinics are to notify the patients of the denial outcomes and steps going forward.

## 2023-11-15 NOTE — TELEPHONE ENCOUNTER
See my chart, patient states [facility] pharmacy did not receive the olmesartan rx   Per chart review this was sent on 11/14/23 and receipt confirmed on rx     RN placed call to [facility] spoke to pharmacist who verified that they do have the olmesartan rx, the concern is the insurance they have on file for patient is not current - responded back to patient via my chart     Mickie Regan Registered Nurse  [facility]

## 2023-11-16 ENCOUNTER — HOSPITAL ENCOUNTER (EMERGENCY)
Facility: CLINIC | Age: 43
Discharge: HOME | End: 2023-11-16
Attending: EMERGENCY MEDICINE | Admitting: EMERGENCY MEDICINE
Payer: OTHER GOVERNMENT

## 2023-11-16 ENCOUNTER — APPOINTMENT (OUTPATIENT)
Dept: CT IMAGING | Facility: CLINIC | Age: 43
End: 2023-11-16
Attending: EMERGENCY MEDICINE
Payer: OTHER GOVERNMENT

## 2023-11-16 VITALS
RESPIRATION RATE: 12 BRPM | WEIGHT: 176.37 LBS | BODY MASS INDEX: 29.35 KG/M2 | OXYGEN SATURATION: 99 % | DIASTOLIC BLOOD PRESSURE: 74 MMHG | HEART RATE: 94 BPM | TEMPERATURE: 98 F | SYSTOLIC BLOOD PRESSURE: 112 MMHG

## 2023-11-16 DIAGNOSIS — R07.9 CHEST PAIN, UNSPECIFIED TYPE: ICD-10-CM

## 2023-11-16 LAB
ALBUMIN SERPL BCG-MCNC: 4.3 G/DL (ref 3.5–5.2)
ALP SERPL-CCNC: 63 U/L (ref 40–150)
ALT SERPL W P-5'-P-CCNC: 31 U/L (ref 0–50)
ANION GAP SERPL CALCULATED.3IONS-SCNC: 11 MMOL/L (ref 7–15)
AST SERPL W P-5'-P-CCNC: 30 U/L (ref 0–45)
ATRIAL RATE - MUSE: 111 BPM
BASOPHILS # BLD AUTO: 0.1 10E3/UL (ref 0–0.2)
BASOPHILS NFR BLD AUTO: 1 %
BILIRUB SERPL-MCNC: 0.4 MG/DL
BUN SERPL-MCNC: 7.4 MG/DL (ref 6–20)
CALCIUM SERPL-MCNC: 9.2 MG/DL (ref 8.6–10)
CHLORIDE SERPL-SCNC: 103 MMOL/L (ref 98–107)
CREAT SERPL-MCNC: 0.79 MG/DL (ref 0.51–0.95)
DEPRECATED HCO3 PLAS-SCNC: 24 MMOL/L (ref 22–29)
DIASTOLIC BLOOD PRESSURE - MUSE: NORMAL MMHG
EGFRCR SERPLBLD CKD-EPI 2021: >90 ML/MIN/1.73M2
EOSINOPHIL # BLD AUTO: 0.1 10E3/UL (ref 0–0.7)
EOSINOPHIL NFR BLD AUTO: 1 %
ERYTHROCYTE [DISTWIDTH] IN BLOOD BY AUTOMATED COUNT: 11.8 % (ref 10–15)
GLUCOSE SERPL-MCNC: 104 MG/DL (ref 70–99)
HCT VFR BLD AUTO: 44.3 % (ref 35–47)
HGB BLD-MCNC: 15.4 G/DL (ref 11.7–15.7)
IMM GRANULOCYTES # BLD: 0 10E3/UL
IMM GRANULOCYTES NFR BLD: 0 %
INTERPRETATION ECG - MUSE: NORMAL
LYMPHOCYTES # BLD AUTO: 2.6 10E3/UL (ref 0.8–5.3)
LYMPHOCYTES NFR BLD AUTO: 30 %
MCH RBC QN AUTO: 30.7 PG (ref 26.5–33)
MCHC RBC AUTO-ENTMCNC: 34.8 G/DL (ref 31.5–36.5)
MCV RBC AUTO: 88 FL (ref 78–100)
MONOCYTES # BLD AUTO: 0.8 10E3/UL (ref 0–1.3)
MONOCYTES NFR BLD AUTO: 8 %
NEUTROPHILS # BLD AUTO: 5.3 10E3/UL (ref 1.6–8.3)
NEUTROPHILS NFR BLD AUTO: 60 %
NRBC # BLD AUTO: 0 10E3/UL
NRBC BLD AUTO-RTO: 0 /100
P AXIS - MUSE: 65 DEGREES
PLATELET # BLD AUTO: 346 10E3/UL (ref 150–450)
POTASSIUM SERPL-SCNC: 3.7 MMOL/L (ref 3.4–5.3)
PR INTERVAL - MUSE: 162 MS
PROT SERPL-MCNC: 7.3 G/DL (ref 6.4–8.3)
QRS DURATION - MUSE: 80 MS
QT - MUSE: 324 MS
QTC - MUSE: 440 MS
R AXIS - MUSE: 47 DEGREES
RBC # BLD AUTO: 5.01 10E6/UL (ref 3.8–5.2)
SODIUM SERPL-SCNC: 138 MMOL/L (ref 135–145)
SYSTOLIC BLOOD PRESSURE - MUSE: NORMAL MMHG
T AXIS - MUSE: 68 DEGREES
TROPONIN T SERPL HS-MCNC: <6 NG/L
TROPONIN T SERPL HS-MCNC: <6 NG/L
VENTRICULAR RATE- MUSE: 111 BPM
WBC # BLD AUTO: 8.9 10E3/UL (ref 4–11)

## 2023-11-16 PROCEDURE — 85025 COMPLETE CBC W/AUTO DIFF WBC: CPT | Performed by: EMERGENCY MEDICINE

## 2023-11-16 PROCEDURE — 250N000009 HC RX 250: Performed by: EMERGENCY MEDICINE

## 2023-11-16 PROCEDURE — 84484 ASSAY OF TROPONIN QUANT: CPT | Performed by: EMERGENCY MEDICINE

## 2023-11-16 PROCEDURE — 250N000011 HC RX IP 250 OP 636: Performed by: EMERGENCY MEDICINE

## 2023-11-16 PROCEDURE — 80053 COMPREHEN METABOLIC PANEL: CPT | Performed by: EMERGENCY MEDICINE

## 2023-11-16 PROCEDURE — 71275 CT ANGIOGRAPHY CHEST: CPT

## 2023-11-16 PROCEDURE — 93005 ELECTROCARDIOGRAM TRACING: CPT

## 2023-11-16 PROCEDURE — 36415 COLL VENOUS BLD VENIPUNCTURE: CPT | Performed by: EMERGENCY MEDICINE

## 2023-11-16 PROCEDURE — 99285 EMERGENCY DEPT VISIT HI MDM: CPT | Mod: 25

## 2023-11-16 RX ORDER — IOPAMIDOL 755 MG/ML
500 INJECTION, SOLUTION INTRAVASCULAR ONCE
Status: COMPLETED | OUTPATIENT
Start: 2023-11-16 | End: 2023-11-16

## 2023-11-16 RX ORDER — LOSARTAN POTASSIUM 50 MG/1
50 TABLET ORAL DAILY
Qty: 30 TABLET | Refills: 0 | Status: SHIPPED | OUTPATIENT
Start: 2023-11-16 | End: 2023-12-07

## 2023-11-16 RX ADMIN — SODIUM CHLORIDE 91 ML: 9 INJECTION, SOLUTION INTRAVENOUS at 11:35

## 2023-11-16 RX ADMIN — IOPAMIDOL 72 ML: 755 INJECTION, SOLUTION INTRAVENOUS at 11:35

## 2023-11-16 ASSESSMENT — ACTIVITIES OF DAILY LIVING (ADL)
ADLS_ACUITY_SCORE: 35
ADLS_ACUITY_SCORE: 33

## 2023-11-16 NOTE — TELEPHONE ENCOUNTER
"Continuing discussion in separate \"encounter\". See other telephone encounter from 11/15/23.     Andrew Kirk MD  [facility]  11/16/2023    "

## 2023-11-16 NOTE — TELEPHONE ENCOUNTER
"Will send alternative, covered ARB instead. Losartan, 50 mg sent to pharmacy.  Please let patient know this is in the same class as original prescription \"olmesartan\" with similar side effect profile.    Thanks,    Andrew Kirk MD  [facility]  11/16/2023    "

## 2023-11-16 NOTE — ED TRIAGE NOTES
Pt arrives with chest pain and SOB that started this morning. Recently diagnosed with aortic aneurism. Hx of hypertension. Heart rate in triage is ranging from 90-150s during assessment. Pt states numbness and tingling to lips, tongue, hands and feet that started on the way into the ER today.       Triage Assessment (Adult)       Row Name 11/16/23 1011          Triage Assessment    Airway WDL WDL        Respiratory WDL    Respiratory WDL X        Cardiac WDL    Cardiac WDL X        Chest Pain Assessment    Chest Pain Location anterior chest, left

## 2023-11-16 NOTE — DISCHARGE INSTRUCTIONS
Please make an appointment to follow up with your primary care provider in 2-3 days even if entirely better.    Discharge Instructions  Chest Pain    You have been seen today for chest pain or discomfort.  At this time, your provider has found no signs that your chest pain is due to a serious or life-threatening condition, (or you have declined more testing and/or admission to the hospital). However, sometimes there is a serious problem that does not show up right away. Your evaluation today may not be complete and you may need further testing and evaluation.     Generally, every Emergency Department visit should have a follow-up clinic visit with either a primary or a specialty clinic/provider. Please follow-up as instructed by your emergency provider today.  Return to the Emergency Department if:  Your chest pain changes, gets worse, starts to happen more often, or comes with less activity.  You are newly short of breath.  You get very weak or tired.  You pass out or faint.  You have any new symptoms, like fever, cough, numb legs, or you cough up blood.  You have anything else that worries you.    Until you follow-up with your regular provider, please do the following:  Take one aspirin daily unless you have an allergy or are told not to by your provider.  If a stress test appointment has been made, go to the appointment.  If you have questions, contact your regular provider.  Follow-up with your regular provider/clinic as directed; this is very important.    If you were given a prescription for medicine here today, be sure to read all of the information (including the package insert) that comes with your prescription.  This will include important information about the medicine, its side effects, and any warnings that you need to know about.  The pharmacist who fills the prescription can provide more information and answer questions you may have about the medicine.  If you have questions or concerns that the  pharmacist cannot address, please call or return to the Emergency Department.       Remember that you can always come back to the Emergency Department if you are not able to see your regular provider in the amount of time listed above, if you get any new symptoms, or if there is anything that worries you.

## 2023-11-16 NOTE — TELEPHONE ENCOUNTER
Called pt and advised of below from Dr Andrew Kirk.  Pt reports she failed 2 beta blockers.      Pt verbalized understanding and agreeable to plan, pt agrees to monitor closely for side effects and let us know if she has any concerns with the ARB.    Brigida Posada RN, BSN  [facility]

## 2023-11-16 NOTE — ED PROVIDER NOTES
History   Chief Complaint:  Chest Pain       HPI     Amina Roca is a 43 year old female who presents with chest pain. The patient started to have shortness of breath on Monday, followed by some chest pain at around 1 hour PTA, along with diaphoresis and increased pulse. The chest pain does not radiate anywhere, and nothing makes it better or worse. She has had some dizziness as well. She had some tingling and numbness in her extremities, along with some black spots in her vision. She denies DVT/PE history, hemoptysis, cancer history, recent travel, or hormone replacement therapy. She denies the use of stimulants, including caffeine and tobacco. She denies fevers, cough, runny nose, or nasal congestion.     ECHO 10/30  Procedure  Complete Echo Adult.  Interpretation Summary     Left ventricular systolic function is normal.  The visual ejection fraction is 60-65%.  No regional wall motion abnormalities noted.  The aortic valve is not well visualized. Possible bicuspid aortic valve with  small raphe between non and right coronary cusp.  Mild valvular aortic stenosis.  The mean AoV pressure gradient is 11mmHg.  The ascending aorta is Borderline dilated (4cm)  The study was technically adequate. There is no comparison study available.      Independent Historian:    None    Review of External Notes:  None    Medications:    clonidine  Epinephrine   losartan   olmesartan     Past Medical History:    Bicuspid aortic valve with ascending aorta 4.0 to 4.5 cm in diameter  Hypertension  Migraine    Past Surgical History:    Vaginal hysterectomy   Physical Exam   Patient Vitals for the past 24 hrs:   BP Temp Temp src Pulse Resp SpO2 Weight   11/16/23 1330 112/74 -- -- 94 12 99 % --   11/16/23 1302 107/80 -- -- 84 -- 100 % --   11/16/23 1232 127/76 -- -- 90 -- 100 % --   11/16/23 1133 (!) 140/89 -- -- 93 19 100 % --   11/16/23 1118 (!) 144/96 -- -- 94 -- 100 % --   11/16/23 1058 (!) 146/89 -- -- -- -- 100 % --   11/16/23  1053 -- -- -- 103 -- 100 % --   11/16/23 1012 (!) 153/94 98  F (36.7  C) Temporal (!) 134 18 100 % 80 kg (176 lb 5.9 oz)        Physical Exam      HEENT:    Oropharynx is moist  Eyes:    Conjunctiva normal  Neck:     Supple, no meningismus.     CV:     Tachycardic, regular rhythm.      No murmurs, rubs or gallops.       No unilateral leg swelling.       2+ radial pulses bilateral.       No lower extremity edema.  PULM:    Clear to auscultation bilateral.       No respiratory distress.      Good air exchange.     No rales or wheezing.     No stridor.  ABD:    Soft, non-tender, non-distended.       No pulsatile masses.       No rebound, guarding or rigidity.  MSK:     No gross deformity to all four extremities.   LYMPH:   No cervical lymphadenopathy.  NEURO:   Alert. Good muscle tone, no atrophy.  Skin:    Warm, dry and intact.    Psych:    Mood is good and affect is appropriate.      Emergency Department Course   ECG  ECG taken at 1018, ECG read at 1033  Sinus tachycardia  Otherwise normal ECG   Rate 111 bpm. IN interval 162 ms. QRS duration 80 ms. QT/QTc 324/440 ms. P-R-T axes 65 47 68.    Imaging:  CT Chest Pulmonary Embolism w Contrast   Preliminary Result   IMPRESSION: No convincing acute pulmonary embolus or acute finding in   the thorax.      Exercise Stress Echocardiogram    (Results Pending)     Report per radiology    Laboratory:  Labs Ordered and Resulted from Time of ED Arrival to Time of ED Departure   COMPREHENSIVE METABOLIC PANEL - Abnormal       Result Value    Sodium 138      Potassium 3.7      Carbon Dioxide (CO2) 24      Anion Gap 11      Urea Nitrogen 7.4      Creatinine 0.79      GFR Estimate >90      Calcium 9.2      Chloride 103      Glucose 104 (*)     Alkaline Phosphatase 63      AST 30      ALT 31      Protein Total 7.3      Albumin 4.3      Bilirubin Total 0.4     TROPONIN T, HIGH SENSITIVITY - Normal    Troponin T, High Sensitivity <6     TROPONIN T, HIGH SENSITIVITY - Normal    Troponin T,  High Sensitivity <6     CBC WITH PLATELETS AND DIFFERENTIAL    WBC Count 8.9      RBC Count 5.01      Hemoglobin 15.4      Hematocrit 44.3      MCV 88      MCH 30.7      MCHC 34.8      RDW 11.8      Platelet Count 346      % Neutrophils 60      % Lymphocytes 30      % Monocytes 8      % Eosinophils 1      % Basophils 1      % Immature Granulocytes 0      NRBCs per 100 WBC 0      Absolute Neutrophils 5.3      Absolute Lymphocytes 2.6      Absolute Monocytes 0.8      Absolute Eosinophils 0.1      Absolute Basophils 0.1      Absolute Immature Granulocytes 0.0      Absolute NRBCs 0.0       Emergency Department Course & Assessments:    Interventions:  Medications   CT SCAN FLUSH (91 mLs Intravenous $Given 23 1135)   iopamidol (ISOVUE-370) solution 500 mL (72 mLs Intravenous $Given 23 1135)      Assessments:  1025 I obtained history and examined patient.   1221 I rechecked the patient and explained findings.  1329 I rechecked the patient and explained findings.    Independent Interpretation (X-rays, CTs, rhythm strip):  Reviewed Patient's chest CT in which there is no large central pulmonary embolism    Consultations/Discussion of Management or Tests:  None    Social Determinants of Health affecting care:  None     Disposition:  The patient was discharged to home.   Impression & Plan    Medical Decision Makin-year-old female presents with atypical chest pain.  EKG is without ischemic changes nor dysrhythmia.  Due to the presenting tachycardia and reported aortic aneurysm noted on echo, I deferred D-dimer test and went straight to CT scan.  CT scan is without aortic dissection, significant aortic aneurysm or pulmonary embolism.  Initial troponin within normal limits.  Based on duration of symptoms, she underwent a 2-hour delta troponin in which troponin remains undetectable.  Differential diagnosis would include costochondritis, pleurisy, atypical reflux, esophageal spasm, anxiety.  Outpatient stress  test ordered.  Close follow-up with PCP.      Diagnosis:    ICD-10-CM    1. Chest pain, unspecified type  R07.9 Exercise Stress Echocardiogram           Discharge Medications:  New Prescriptions    No medications on file     Scribe Disclosure:  Aniyah PEREIRA, am serving as a scribe at 10:22 AM on 11/16/2023 to document services personally performed by Saturnino Vital MD based on my observations and the provider's statements to me.  11/16/2023   Saturnino Vital MD Matthews, Jeremiah R, MD  11/16/23 4061

## 2023-11-27 ENCOUNTER — OFFICE VISIT (OUTPATIENT)
Dept: FAMILY MEDICINE | Facility: CLINIC | Age: 43
End: 2023-11-27
Payer: OTHER GOVERNMENT

## 2023-11-27 VITALS
WEIGHT: 175 LBS | HEIGHT: 65 IN | SYSTOLIC BLOOD PRESSURE: 119 MMHG | HEART RATE: 78 BPM | BODY MASS INDEX: 29.16 KG/M2 | OXYGEN SATURATION: 100 % | RESPIRATION RATE: 12 BRPM | TEMPERATURE: 98.1 F | DIASTOLIC BLOOD PRESSURE: 77 MMHG

## 2023-11-27 DIAGNOSIS — K63.8219 SMALL INTESTINAL BACTERIAL OVERGROWTH (SIBO): ICD-10-CM

## 2023-11-27 DIAGNOSIS — R07.89 CHEST WALL PAIN: ICD-10-CM

## 2023-11-27 DIAGNOSIS — G43.909 MIGRAINE WITHOUT STATUS MIGRAINOSUS, NOT INTRACTABLE, UNSPECIFIED MIGRAINE TYPE: ICD-10-CM

## 2023-11-27 DIAGNOSIS — I10 HYPERTENSION, UNSPECIFIED TYPE: Primary | ICD-10-CM

## 2023-11-27 DIAGNOSIS — R10.32 LEFT LOWER QUADRANT PAIN: ICD-10-CM

## 2023-11-27 PROCEDURE — 99214 OFFICE O/P EST MOD 30 MIN: CPT | Performed by: STUDENT IN AN ORGANIZED HEALTH CARE EDUCATION/TRAINING PROGRAM

## 2023-11-27 RX ORDER — CLONIDINE 0.17 MG/1
0.34 TABLET, EXTENDED RELEASE ORAL DAILY
Qty: 60 TABLET | Refills: 0 | Status: SHIPPED | OUTPATIENT
Start: 2023-11-27 | End: 2023-12-28

## 2023-11-27 ASSESSMENT — PAIN SCALES - GENERAL: PAINLEVEL: NO PAIN (0)

## 2023-11-27 NOTE — PROGRESS NOTES
"  Assessment & Plan     Hypertension, unspecified type  Migraine without status migrainosus, not intractable, unspecified migraine type  Currently on clonidine IR for both HTN and migraine prophylaxis. Did not tolerate losartan. BP WNL in clinic today. Likely that HTN and rapid pulse are related to rebound clonidine symptoms. Plan to transition from IR to ER tablet.  - CloNIDine HCl 0.17 MG TB24  Dispense: 60 tablet; Refill: 0    Left lower quadrant pain  Small intestinal bacterial overgrowth (SIBO)  Unclear etiology of LLQ. Patient notes prior hx of SIBO and similar symptoms thus will refer to GI. Of note, symptoms do correlate with exercise and exam positive for FADIR thus if not improving/persistent, consider referral to sports medicine.   - Adult GI  Referral - Consult Only    Chest wall pain  Discussed conservative therapies including ice/heat, NSAIDs/tylenol and activity modification. Of note, was seen in ED recently for similar sxs along with SOB. Workup was negative and outpatient stress echo ordered but patient would prefer to see Cardiology first which seems very reasonable. Declines PT referral today.    BMI:   Estimated body mass index is 29.12 kg/m  as calculated from the following:    Height as of this encounter: 1.651 m (5' 5\").    Weight as of this encounter: 79.4 kg (175 lb).     Follow up in 3-6 months for annual physical    Andrew Kikr MD  [facility]  11/27/2023    Tyrell Huynh is a 43 year old, presenting for the following health issues:  Follow Up        11/27/2023    10:42 AM   Additional Questions   Roomed by Elis Abbott VF       HTN/rapid heart rate  Tried taking losartan for a couple of days but caused pulse to increase to 140's for a full 24 hours. This improved but then when she took the second dose, it increased again into the 140's. She also noted severe headache, seeing spots and irritability so stopped taking this.  Has noticed increase HR 4-5 " "hours after taking clonidine. This is typically when she notes elevated BP as well. BP and pulse tend to be normal for the first 1-3 hours of taking this. She recently gave up morning caffeine in the past month which seems to have helped decrease her heart rate as well. Notes that she took clonidine this morning about one hour prior to her appointment.     Chest pain  Still consistent/persistent. Worsens with pressing over the left chest.   Does have remote hx of outdoor parasol hitting her in the chest several years ago but never had any problems until now.    Abdominal pain  Since initial onset, hasn't had any return of acute attack.   Still noticing discomfort/pain all in the same area, in the LLQ. Sometimes radiating into medial thigh.   It is \"unhappy\" most of the time. More like an awareness that pain is present. Sometimes pinch-y or achy.   When she is up and walking more, pain is about 4-5 the next day and associated with increased diarrhea.   Also seems to be generally nauseated since this all started. No fevers. No other symptoms that have evolved since onset.    GI hx:  After last son was born, had pretty significant GI symptoms, had a bunch of labs tested, gallbladder, EGD, barium studies - all were normal. Then was finally tested for SIBO and this came back positive. Abx for 30 days, then probiotics for another 60 days. This seemed to improve symptoms. These symptoms are similar.    History of Present Illness       Hypertension: She presents for follow up of hypertension.  She does check blood pressure  regularly outside of the clinic. Outside blood pressures have been over 140/90. She follows a low salt diet.     She eats 2-3 servings of fruits and vegetables daily.She consumes 1 sweetened beverage(s) daily.She exercises with enough effort to increase her heart rate 20 to 29 minutes per day.  She exercises with enough effort to increase her heart rate 3 or less days per week.   She is taking " "medications regularly.         Objective    /77 (BP Location: Left arm, Patient Position: Sitting, Cuff Size: Adult Large)   Pulse 78   Temp 98.1  F (36.7  C) (Oral)   Resp 12   Ht 1.651 m (5' 5\")   Wt 79.4 kg (175 lb)   SpO2 100%   BMI 29.12 kg/m    Body mass index is 29.12 kg/m .    Physical Exam   GENERAL: healthy, alert and no distress  HEAD: Normocephalic, atraumatic.   EYES: Normal conjunctivae, sclera.   RESP: lungs clear to auscultation - no rales, rhonchi or wheezes  CV: regular rate and rhythm, normal S1 S2, no murmur, click, rub or gallop. No peripheral swelling noted.   ABDOMEN: soft, TTP in LLQ, no rebound tenderness or guarding present. No hepatomegaly or masses appreciated. BS normactive.  MSK: Hips: Log roll negative bilaterally. L Hip: Negative STELLA. Pain reproduced in LLQ with FADIR.   SKIN: no suspicious lesions or rashes.  EXT: Warm and well perfused. DP pulses 2+ bilaterally.  NEURO: CNII-XII grossly intact. No focal deficits.  PSYCH: Groomed, dressed appropriately for weather. Normal mood with consistent affect.         "

## 2023-12-01 ENCOUNTER — HOSPITAL ENCOUNTER (OUTPATIENT)
Dept: MAMMOGRAPHY | Facility: CLINIC | Age: 43
Discharge: HOME | End: 2023-12-01
Attending: STUDENT IN AN ORGANIZED HEALTH CARE EDUCATION/TRAINING PROGRAM
Payer: OTHER GOVERNMENT

## 2023-12-01 ENCOUNTER — HOSPITAL ENCOUNTER (OUTPATIENT)
Dept: ULTRASOUND IMAGING | Facility: CLINIC | Age: 43
Discharge: HOME | End: 2023-12-01
Attending: STUDENT IN AN ORGANIZED HEALTH CARE EDUCATION/TRAINING PROGRAM
Payer: OTHER GOVERNMENT

## 2023-12-01 DIAGNOSIS — R22.32 AXILLARY LUMP, LEFT: ICD-10-CM

## 2023-12-01 PROCEDURE — 77062 BREAST TOMOSYNTHESIS BI: CPT

## 2023-12-01 PROCEDURE — 76882 US LMTD JT/FCL EVL NVASC XTR: CPT | Mod: LT

## 2023-12-07 ENCOUNTER — OFFICE VISIT (OUTPATIENT)
Dept: CARDIOLOGY | Facility: CLINIC | Age: 43
End: 2023-12-07
Payer: OTHER GOVERNMENT

## 2023-12-07 VITALS
SYSTOLIC BLOOD PRESSURE: 120 MMHG | HEIGHT: 65 IN | BODY MASS INDEX: 29.16 KG/M2 | HEART RATE: 95 BPM | DIASTOLIC BLOOD PRESSURE: 70 MMHG | WEIGHT: 175 LBS

## 2023-12-07 DIAGNOSIS — Q23.81 BICUSPID AORTIC VALVE: ICD-10-CM

## 2023-12-07 DIAGNOSIS — R00.0 TACHYCARDIA: Primary | ICD-10-CM

## 2023-12-07 DIAGNOSIS — I10 HYPERTENSION, UNSPECIFIED TYPE: ICD-10-CM

## 2023-12-07 PROCEDURE — 99204 OFFICE O/P NEW MOD 45 MIN: CPT | Performed by: INTERNAL MEDICINE

## 2023-12-07 RX ORDER — LOSARTAN POTASSIUM 50 MG/1
50 TABLET ORAL DAILY
Qty: 30 TABLET | Refills: 0 | Status: SHIPPED | OUTPATIENT
Start: 2023-12-07 | End: 2024-02-05

## 2023-12-07 NOTE — LETTER
12/7/2023    Andrew Kirk MD  [id number]  [facility] [id number]    RE: Amina Roca       Dear Colleague,     I had the pleasure of seeing Amina Roca in the [facility].  HPI and Plan:   Amina Roca is a 43 year old female who presents with history of bicuspid aortic valve and palpitations and tachycardia.  She was diagnosed with a bicuspid aortic valve at age 9.  She was seen in the emergency department a month ago for symptoms of chest pain and palpitations.  She underwent evaluation with troponin levels which were normal, EKG which I reviewed showing a normal sinus rhythm without acute ST changes, a chest CT which ruled out dissection and pulmonary embolism, it also showed a nonaneurysmal aorta.  However her echocardiogram suggested that she had mild dilatation of the ascending aorta with maximal diameter 4 cm.  She was noted to have mild aortic stenosis without insufficiency and normal RV and LV function.  She is not experiencing recurrent chest pain symptoms however she does state her heart hurts when it is beating too fast and she continues to have a resting fast heartbeat with intermittent palpitations and tachycardia when it is faster.  She has not worn a heart monitor in the past.  She did follow with a cardiologist in [facility] but has recently moved appear.  She has issues with high blood pressure and she has been on couple different medications for this but is now on long-acting clonidine that is working well for her.  She had tried metoprolol and labetalol in the past for both blood pressure and fast heart rate but had a severe depressive reaction to these medications.  I reviewed lab work from last month, she had a thyroid test which was normal, CBC was normal    Summary    1.  Bicuspid aortic valve with mild stenosis-this is not likely causing any symptoms at this time.  I would recommend follow-up echocardiogram every 3 to 5 years to monitor for progression  of degenerative valve disease.  The echocardiogram had suggested mild ascending aortic dilatation however three-dimensional imaging with chest CT suggested it was nonaneurysmal which is more accurate.  However she is at risk of developing ascending aortic aneurysm given her bicuspid valve.  We can continue to monitor for this with echocardiogram at this time.    2.  Tachycardia-I would like her to wear a 24-hour Holter monitor to look at her heart rate variability and for arrhythmias.  She has not tolerated several beta-blockers with an unusual but severe depressive reaction.  She has no underlying history of dysthymia or depression.  Alternatives could be a trial of Bystolic which has a lower risk side effect profile and/or calcium channel blocker such as diltiazem or verapamil.  We would likely have to replace her clonidine with 1 of these as she likely does not need both for her blood pressure.  I hate to take her off of clonidine long-acting as it seems to be working really well for her blood pressure.  An alternative may be to give her a short acting form of diltiazem just for symptoms as needed.  We will have her come back with the results of her Holter monitor to make a decision about management of her palpitations and tachycardia.    Please feel free to contact me with any questions you have regards to her care         Today's clinic visit entailed:  Review of external notes as documented elsewhere in note  Review of the result(s) of each unique test - echo, chest CT, trop, TSH, CBC  Ordering of each unique test    Provider  Link to [facility] Help Grid     The level of medical decision making during this visit was of moderate complexity.    Orders Placed This Encounter   Procedures    Follow-Up with Cardiology    Holter Monitor 24 hour Adult Pediatric     No orders of the defined types were placed in this encounter.    There are no discontinued medications.      Encounter Diagnoses   Name Primary?    Bicuspid  aortic valve     Tachycardia Yes       CURRENT MEDICATIONS:  Current Outpatient Medications   Medication Sig Dispense Refill    CloNIDine HCl 0.17 MG TB24 Take 0.34 mg by mouth daily 60 tablet 0    EPINEPHrine (ANY BX GENERIC EQUIV) 0.3 MG/0.3ML injection 2-pack Inject 0.3 mLs (0.3 mg) into the muscle as needed for anaphylaxis May repeat one time in 5-15 minutes if response to initial dose is inadequate. 2 each 0    cloNIDine (CATAPRES) 0.2 MG tablet  (Patient not taking: Reported on 12/7/2023)      losartan (COZAAR) 50 MG tablet Take 1 tablet (50 mg) by mouth daily (Patient not taking: Reported on 11/27/2023) 30 tablet 0       ALLERGIES     Allergies   Allergen Reactions    Nuts Shortness Of Breath, Itching, Swelling and Rash     peanuts    Penicillins Anaphylaxis    Shellfish Allergy Dermatitis, Diarrhea, Dizziness, Fatigue, Headache, Hives, Itching, Nausea, Palpitations, Shortness Of Breath and Visual Disturbance    Reglan [Metoclopramide] Other (See Comments)     Sweating       PAST MEDICAL HISTORY:  Past Medical History:   Diagnosis Date    Migraine        PAST SURGICAL HISTORY:  Past Surgical History:   Procedure Laterality Date    HYSTERECTOMY         FAMILY HISTORY:  History reviewed. No pertinent family history.    SOCIAL HISTORY:  Social History     Socioeconomic History    Marital status:      Spouse name: None    Number of children: None    Years of education: None    Highest education level: None   Tobacco Use    Smoking status: Never    Smokeless tobacco: Never   Vaping Use    Vaping Use: Never used     Social Determinants of Health     Financial Resource Strain: Low Risk  (11/24/2023)    Financial Resource Strain     Within the past 12 months, have you or your family members you live with been unable to get utilities (heat, electricity) when it was really needed?: No   Food Insecurity: Low Risk  (11/24/2023)    Food Insecurity     Within the past 12 months, did you worry that your food would  "run out before you got money to buy more?: No     Within the past 12 months, did the food you bought just not last and you didn t have money to get more?: No   Transportation Needs: Low Risk  (11/24/2023)    Transportation Needs     Within the past 12 months, has lack of transportation kept you from medical appointments, getting your medicines, non-medical meetings or appointments, work, or from getting things that you need?: No   Interpersonal Safety: Low Risk  (10/4/2023)    Interpersonal Safety     Do you feel physically and emotionally safe where you currently live?: Yes     Within the past 12 months, have you been hit, slapped, kicked or otherwise physically hurt by someone?: No     Within the past 12 months, have you been humiliated or emotionally abused in other ways by your partner or ex-partner?: No   Housing Stability: Low Risk  (11/24/2023)    Housing Stability     Do you have housing? : Yes     Are you worried about losing your housing?: No       Review of Systems:  Skin:        Eyes:       ENT:       Respiratory:  Positive for    Cardiovascular:    Positive for;edema;fatigue  Gastroenterology:      Genitourinary:       Musculoskeletal:       Neurologic:       Psychiatric:       Heme/Lymph/Imm:       Endocrine:         Physical Exam:  Vitals: /70   Pulse 95   Ht 1.651 m (5' 5\")   Wt 79.4 kg (175 lb)   BMI 29.12 kg/m      Constitutional:  cooperative;in no acute distress        Skin:  warm and dry to the touch          Head:  normocephalic        Eyes:  pupils equal and round        Lymph:      ENT:  no pallor or cyanosis        Neck:  no carotid bruit        Respiratory:  clear to auscultation;normal symmetry         Cardiac: regular rhythm         systolic ejection murmur;grade 2      pulses full and equal                                        GI:  abdomen soft        Extremities and Muscular Skeletal:  no deformities, clubbing, cyanosis, erythema observed;no edema              Neurological:  " "no gross motor deficits;affect appropriate        Psych:  Alert and Oriented x 3        Recent Lab Results:  LIPID RESULTS:  No results found for: \"CHOL\", \"HDL\", \"LDL\", \"TRIG\", \"CHOLHDLRATIO\"    LIVER ENZYME RESULTS:  Lab Results   Component Value Date    AST 30 11/16/2023    ALT 31 11/16/2023       CBC RESULTS:  Lab Results   Component Value Date    WBC 8.9 11/16/2023    RBC 5.01 11/16/2023    HGB 15.4 11/16/2023    HCT 44.3 11/16/2023    MCV 88 11/16/2023    MCH 30.7 11/16/2023    MCHC 34.8 11/16/2023    RDW 11.8 11/16/2023     11/16/2023       BMP RESULTS:  Lab Results   Component Value Date     11/16/2023    POTASSIUM 3.7 11/16/2023    CHLORIDE 103 11/16/2023    CO2 24 11/16/2023    ANIONGAP 11 11/16/2023     (H) 11/16/2023    BUN 7.4 11/16/2023    CR 0.79 11/16/2023    GFRESTIMATED >90 11/16/2023    ANDRE 9.2 11/16/2023        A1C RESULTS:  No results found for: \"A1C\"    INR RESULTS:  No results found for: \"INR\"        CC  Andrew Kirk MD  [id number] [place] [id number]        Thank you for allowing me to participate in the care of your patient.      Sincerely,     Ashley Gaytan DO     [facility]  "

## 2023-12-07 NOTE — PROGRESS NOTES
HPI and Plan:   Amina Roca is a 43 year old female who presents with history of bicuspid aortic valve and palpitations and tachycardia.  She was diagnosed with a bicuspid aortic valve at age 9.  She was seen in the emergency department a month ago for symptoms of chest pain and palpitations.  She underwent evaluation with troponin levels which were normal, EKG which I reviewed showing a normal sinus rhythm without acute ST changes, a chest CT which ruled out dissection and pulmonary embolism, it also showed a nonaneurysmal aorta.  However her echocardiogram suggested that she had mild dilatation of the ascending aorta with maximal diameter 4 cm.  She was noted to have mild aortic stenosis without insufficiency and normal RV and LV function.  She is not experiencing recurrent chest pain symptoms however she does state her heart hurts when it is beating too fast and she continues to have a resting fast heartbeat with intermittent palpitations and tachycardia when it is faster.  She has not worn a heart monitor in the past.  She did follow with a cardiologist in [facility] but has recently moved appear.  She has issues with high blood pressure and she has been on couple different medications for this but is now on long-acting clonidine that is working well for her.  She had tried metoprolol and labetalol in the past for both blood pressure and fast heart rate but had a severe depressive reaction to these medications.  I reviewed lab work from last month, she had a thyroid test which was normal, CBC was normal    Summary    1.  Bicuspid aortic valve with mild stenosis-this is not likely causing any symptoms at this time.  I would recommend follow-up echocardiogram every 3 to 5 years to monitor for progression of degenerative valve disease.  The echocardiogram had suggested mild ascending aortic dilatation however three-dimensional imaging with chest CT suggested it was nonaneurysmal which is more accurate.   However she is at risk of developing ascending aortic aneurysm given her bicuspid valve.  We can continue to monitor for this with echocardiogram at this time.    2.  Tachycardia-I would like her to wear a 24-hour Holter monitor to look at her heart rate variability and for arrhythmias.  She has not tolerated several beta-blockers with an unusual but severe depressive reaction.  She has no underlying history of dysthymia or depression.  Alternatives could be a trial of Bystolic which has a lower risk side effect profile and/or calcium channel blocker such as diltiazem or verapamil.  We would likely have to replace her clonidine with 1 of these as she likely does not need both for her blood pressure.  I hate to take her off of clonidine long-acting as it seems to be working really well for her blood pressure.  An alternative may be to give her a short acting form of diltiazem just for symptoms as needed.  We will have her come back with the results of her Holter monitor to make a decision about management of her palpitations and tachycardia.    Please feel free to contact me with any questions you have regards to her care         Today's clinic visit entailed:  Review of external notes as documented elsewhere in note  Review of the result(s) of each unique test - echo, chest CT, trop, TSH, CBC  Ordering of each unique test    Provider  Link to [facility] Help Grid     The level of medical decision making during this visit was of moderate complexity.    Orders Placed This Encounter   Procedures    Follow-Up with Cardiology    Holter Monitor 24 hour Adult Pediatric     No orders of the defined types were placed in this encounter.    There are no discontinued medications.      Encounter Diagnoses   Name Primary?    Bicuspid aortic valve     Tachycardia Yes       CURRENT MEDICATIONS:  Current Outpatient Medications   Medication Sig Dispense Refill    CloNIDine HCl 0.17 MG TB24 Take 0.34 mg by mouth daily 60 tablet 0     EPINEPHrine (ANY BX GENERIC EQUIV) 0.3 MG/0.3ML injection 2-pack Inject 0.3 mLs (0.3 mg) into the muscle as needed for anaphylaxis May repeat one time in 5-15 minutes if response to initial dose is inadequate. 2 each 0    cloNIDine (CATAPRES) 0.2 MG tablet  (Patient not taking: Reported on 12/7/2023)      losartan (COZAAR) 50 MG tablet Take 1 tablet (50 mg) by mouth daily (Patient not taking: Reported on 11/27/2023) 30 tablet 0       ALLERGIES     Allergies   Allergen Reactions    Nuts Shortness Of Breath, Itching, Swelling and Rash     peanuts    Penicillins Anaphylaxis    Shellfish Allergy Dermatitis, Diarrhea, Dizziness, Fatigue, Headache, Hives, Itching, Nausea, Palpitations, Shortness Of Breath and Visual Disturbance    Reglan [Metoclopramide] Other (See Comments)     Sweating       PAST MEDICAL HISTORY:  Past Medical History:   Diagnosis Date    Migraine        PAST SURGICAL HISTORY:  Past Surgical History:   Procedure Laterality Date    HYSTERECTOMY         FAMILY HISTORY:  History reviewed. No pertinent family history.    SOCIAL HISTORY:  Social History     Socioeconomic History    Marital status:      Spouse name: None    Number of children: None    Years of education: None    Highest education level: None   Tobacco Use    Smoking status: Never    Smokeless tobacco: Never   Vaping Use    Vaping Use: Never used     Social Determinants of Health     Financial Resource Strain: Low Risk  (11/24/2023)    Financial Resource Strain     Within the past 12 months, have you or your family members you live with been unable to get utilities (heat, electricity) when it was really needed?: No   Food Insecurity: Low Risk  (11/24/2023)    Food Insecurity     Within the past 12 months, did you worry that your food would run out before you got money to buy more?: No     Within the past 12 months, did the food you bought just not last and you didn t have money to get more?: No   Transportation Needs: Low Risk   "(11/24/2023)    Transportation Needs     Within the past 12 months, has lack of transportation kept you from medical appointments, getting your medicines, non-medical meetings or appointments, work, or from getting things that you need?: No   Interpersonal Safety: Low Risk  (10/4/2023)    Interpersonal Safety     Do you feel physically and emotionally safe where you currently live?: Yes     Within the past 12 months, have you been hit, slapped, kicked or otherwise physically hurt by someone?: No     Within the past 12 months, have you been humiliated or emotionally abused in other ways by your partner or ex-partner?: No   Housing Stability: Low Risk  (11/24/2023)    Housing Stability     Do you have housing? : Yes     Are you worried about losing your housing?: No       Review of Systems:  Skin:        Eyes:       ENT:       Respiratory:  Positive for    Cardiovascular:    Positive for;edema;fatigue  Gastroenterology:      Genitourinary:       Musculoskeletal:       Neurologic:       Psychiatric:       Heme/Lymph/Imm:       Endocrine:         Physical Exam:  Vitals: /70   Pulse 95   Ht 1.651 m (5' 5\")   Wt 79.4 kg (175 lb)   BMI 29.12 kg/m      Constitutional:  cooperative;in no acute distress        Skin:  warm and dry to the touch          Head:  normocephalic        Eyes:  pupils equal and round        Lymph:      ENT:  no pallor or cyanosis        Neck:  no carotid bruit        Respiratory:  clear to auscultation;normal symmetry         Cardiac: regular rhythm         systolic ejection murmur;grade 2      pulses full and equal                                        GI:  abdomen soft        Extremities and Muscular Skeletal:  no deformities, clubbing, cyanosis, erythema observed;no edema              Neurological:  no gross motor deficits;affect appropriate        Psych:  Alert and Oriented x 3        Recent Lab Results:  LIPID RESULTS:  No results found for: \"CHOL\", \"HDL\", \"LDL\", \"TRIG\", " "\"CHOLHDLRATIO\"    LIVER ENZYME RESULTS:  Lab Results   Component Value Date    AST 30 11/16/2023    ALT 31 11/16/2023       CBC RESULTS:  Lab Results   Component Value Date    WBC 8.9 11/16/2023    RBC 5.01 11/16/2023    HGB 15.4 11/16/2023    HCT 44.3 11/16/2023    MCV 88 11/16/2023    MCH 30.7 11/16/2023    MCHC 34.8 11/16/2023    RDW 11.8 11/16/2023     11/16/2023       BMP RESULTS:  Lab Results   Component Value Date     11/16/2023    POTASSIUM 3.7 11/16/2023    CHLORIDE 103 11/16/2023    CO2 24 11/16/2023    ANIONGAP 11 11/16/2023     (H) 11/16/2023    BUN 7.4 11/16/2023    CR 0.79 11/16/2023    GFRESTIMATED >90 11/16/2023    ANDRE 9.2 11/16/2023        A1C RESULTS:  No results found for: \"A1C\"    INR RESULTS:  No results found for: \"INR\"        CC  Andrew Kirk MD  [id number] [id number]                "

## 2023-12-19 ENCOUNTER — TELEPHONE (OUTPATIENT)
Dept: GASTROENTEROLOGY | Facility: CLINIC | Age: 43
End: 2023-12-19
Payer: OTHER GOVERNMENT

## 2023-12-19 NOTE — TELEPHONE ENCOUNTER
[facility] Call Center    Phone Message    May a detailed message be left on voicemail: Yes    Reason for Call: Other: Patient is currently scheduled on 02/19, as visit type New GI Urgent. This is outside the expected timeline for this referral. Patient has been added to the waitlist.      Action Taken: Message routed to:  Other: GI REFERRAL TRIAGE POOL     Travel Screening: Not Applicable

## 2023-12-26 ENCOUNTER — HOSPITAL ENCOUNTER (OUTPATIENT)
Dept: CARDIOLOGY | Facility: CLINIC | Age: 43
Discharge: HOME | End: 2023-12-26
Attending: INTERNAL MEDICINE | Admitting: INTERNAL MEDICINE
Payer: OTHER GOVERNMENT

## 2023-12-26 DIAGNOSIS — R00.0 TACHYCARDIA: ICD-10-CM

## 2023-12-26 PROCEDURE — 93226 XTRNL ECG REC<48 HR SCAN A/R: CPT

## 2023-12-26 PROCEDURE — 93227 XTRNL ECG REC<48 HR R&I: CPT | Performed by: INTERNAL MEDICINE

## 2023-12-26 NOTE — TELEPHONE ENCOUNTER
Records Requested     December 26, 2023 2:49 PM  [id number]   Facility  [facility]   Fax: [phone]  Tracking # [id number]   Outcome Urgent request with [employer] faxed to [facility] for images to be overnighted.      Action 01/02/24  GIA 9:01 AM    Action Taken  [place] imaging disc received. Will be uploaded and resolved in PACS.        REFERRAL INFORMATION:  Referring Provider:  Andrew Kirk MD   [facility]  Reason for Visit/Diagnosis: K63.8219 (ICD-10-CM) - Small intestinal bacterial overgrowth (SIBO)     FUTURE VISIT INFORMATION:  Appointment Date: 1/5/24  Appointment Time: 11:00 AM     NOTES STATUS DETAILS   OFFICE NOTE from Referring Provider Internal 11/27/23, 10/4/23 - [facility] OV with Andrew Kirk MD    OFFICE NOTE from Other Specialist Internal 9/12/23 - [facility] OV with Kayla Silveira PA-C at [facility] DISCHARGE SUMMARY/  ED VISITS Received 10/26/22-10/28/22 - [facility] ED to Admission with Lupe Meraz MD     10/26/22 - [facility] ED visit with Shaq Lambert DO    MEDICATION LIST Internal         PERTINENT LABS Internal 11/16/23 - CMP; CBC/diff    10/26/22 - Blood culture (+ Enterobacterales DNA)    IMAGING (CT, MRI, EGD, MRCP, Small Bowel Follow Through/SBT, MR/CT Enterography) PACS Internal:   CT Chest - 11/16/23    CT Abdomen Pelvis - 9/12/23    US Pelvis - 9/12/23    XR Chest - 9/25/23    [facility]:   CT Pelvis - 10/28/22    XR Chest - 10/26/22, 4/9/19, 6/10/18, 5/8/17    CT Abdomen Pelvis - 10/26/22    CTA Chest - 5/8/17

## 2023-12-27 DIAGNOSIS — I10 HYPERTENSION, UNSPECIFIED TYPE: ICD-10-CM

## 2023-12-28 RX ORDER — CLONIDINE 0.17 MG/1
2 TABLET, EXTENDED RELEASE ORAL DAILY
Qty: 180 TABLET | Refills: 0 | Status: SHIPPED | OUTPATIENT
Start: 2023-12-28 | End: 2024-01-12

## 2024-01-05 ENCOUNTER — PRE VISIT (OUTPATIENT)
Dept: GASTROENTEROLOGY | Facility: CLINIC | Age: 44
End: 2024-01-05

## 2024-01-05 ENCOUNTER — VIRTUAL VISIT (OUTPATIENT)
Dept: GASTROENTEROLOGY | Facility: CLINIC | Age: 44
End: 2024-01-05
Attending: STUDENT IN AN ORGANIZED HEALTH CARE EDUCATION/TRAINING PROGRAM
Payer: OTHER GOVERNMENT

## 2024-01-05 VITALS — BODY MASS INDEX: 28.32 KG/M2 | WEIGHT: 170 LBS | HEIGHT: 65 IN

## 2024-01-05 DIAGNOSIS — R10.32 LLQ ABDOMINAL PAIN: Primary | ICD-10-CM

## 2024-01-05 DIAGNOSIS — K63.8219 SMALL INTESTINAL BACTERIAL OVERGROWTH (SIBO): ICD-10-CM

## 2024-01-05 DIAGNOSIS — R10.13 EPIGASTRIC PAIN: ICD-10-CM

## 2024-01-05 DIAGNOSIS — R19.5 LOOSE STOOLS: ICD-10-CM

## 2024-01-05 PROCEDURE — 99205 OFFICE O/P NEW HI 60 MIN: CPT | Mod: 95 | Performed by: DIETITIAN, REGISTERED

## 2024-01-05 ASSESSMENT — PAIN SCALES - GENERAL: PAINLEVEL: MILD PAIN (2)

## 2024-01-05 NOTE — PATIENT INSTRUCTIONS
It was a pleasure taking care of you today.  I've included a brief summary of our discussion and care plan from today's visit below.  Please review this information with your primary care provider.  ______________________________________________________________________    My recommendations are summarized as follows:  -- Infectious stool studies and fecal calprotectin  -- Schedule upper endoscopy and colonoscopy  -- SIBO breath testing, if positive recommend course of Rifaxamin 550 mg TID x 14 days  -- Try famotidine up to twice daily for upper abdominal pain with eating  --  Start soluble fiber supplementation with Citrucel powder - this should be taken daily. Start with 1 teaspooon - 1/2 tablespoon mixed with large glass of water. Slowly increase dose by 1 teaspoon - 1/2 tablespoon every 1-2 weeks until desired stool consistency is achieved (up to 2-3 tablespoons per day).  -- If infectious stool studies negative can take Imodium as needed    -- please see scheduling information provided below     Return to GI Clinic in 3 months (after proceedures) to review your progress.    ______________________________________________________________________    How do I schedule labs, imaging studies, or procedures that were ordered in clinic today?     Labs: To schedule lab appointment at the [facility] and [facility], use my chart or call [phone]. If you have a [facility] lab closer to home where you are regularly seen you can give them a call.     Procedures: If a colonoscopy, upper endoscopy, breath test, esophageal manometry, or pH impedence was ordered today, our endoscopy team will call you to schedule this. If you have not heard from our endoscopy team within a week, please call [phone] to schedule.     Imaging Studies: If you were scheduled for a CT scan, X-ray, MRI, ultrasound, HIDA scan or other imaging study, please call [phone] to have this scheduled.     Referral: If a referral to another specialty  was ordered, expect a phone call or follow instructions above. If you have not heard from anyone regarding your referral in a week, please call our clinic to check the status.     Who do I call with any questions after my visit?  Please be in touch if there are any further questions that arise following today's visit.  There are multiple ways to contact your gastroenterology care team.      During business hours, you may reach a Gastroenterology nurse at [phone]    To schedule or reschedule an appointment, please call [phone].     You can always send a secure message through [employer].  [employer] messages are answered by your nurse or doctor typically within 24 hours.  Please allow extra time on weekends and holidays.      For urgent/emergent questions after business hours, you may reach the on-call GI Fellow by contacting the [facility] at [phone].     How will I get the results of any tests ordered?    You will receive all of your results.  If you have signed up for [employer], any tests ordered at your visit will be available to you after your provider reviews them.  Typically this takes 1-2 weeks.  If there are urgent results that require a change in your care plan, your provider or nurse will call you to discuss the next steps.      What is [employer]?  [employer] is a secure way for you to access all of your healthcare records from the [facility].  It is a web based computer program, so you can sign on to it from any location.  It also allows you to send secure messages to your care team.  I recommend signing up for [employer] access if you have not already done so and are comfortable with using a computer.      How to I schedule a follow-up visit?  If you did not schedule a follow-up visit today, please call [phone] to schedule a follow-up office visit.      Sincerely,    Lidia Guerra PA-C  Division of Gastroenterology, Hepatology & Nutrition  [facility]

## 2024-01-05 NOTE — PROGRESS NOTES
Virtual Visit Details    Type of service:  Video Visit     Originating Location (pt. Location): Home    Distant Location (provider location):  Off-site  Platform used for Video Visit: [facility]        GI CLINIC VISIT    CC/REFERRING MD:  Andrew Kirk  REASON FOR CONSULTATION: LLQ pain, loose stools    ASSESSMENT/PLAN:  #Epigastric Pain  #LLQ Pain  #Loose Stools  #History of SIBO  Patient with history of onset of severe postprandial epigastric pain in 2010 that lasted for 7 to 8 years until diagnosed with SIBO 7 years ago with round of antibiotics and probiotics.  Of note she reports having EGD in 2010 that likely showed evidence of reflux, reports HIDA and barium swallow normal.  In the past several weeks postprandial epigastric pain has returned, though not as severe.  Additionally in September she had severe onset of left lower quadrant pain that is gradually improved without return of severe symptoms, however has not fully resolved.  CT AP showed left adnexal cyst, however ultrasound was negative.  Since onset of left-sided pain she has also had exclusively loose stools with urgency and occasional nocturnal stools.  No recent weight loss, blood in stool, melena, hematemesis, coffee-ground emesis.  Recent CRP and TSH normal.  Differential for symptoms includes recurrence of SIBO, reflux, gastritis, h.pylori, celiac, functional dyspepsia, IBS, IBD, chronic GI infection, pancreatic insufficiency.  LLQ pain possibly related to muscle injury with association with walking v ovary with prior CT finding, however US reassuring. Per primary care exam FADIR positive, consider referral to sports medicine if GI work up negative.  For GI workup we will start with stool studies for chronic GI infections and fecal calprotectin.  Will also pursue upper endoscopy given postprandial pain and colonoscopy given nocturnal stools (also due for surveillance given age).  Given history of SIBO and similar symptoms with postprandial pain we  will also pursue hydrogen breath testing.  For symptoms she can try famotidine up to twice daily for upper postprandial abdominal pain.  For her left lower quadrant pain, continue heat.  For her loose stools start low-dose soluble fiber supplement with Citrucel at 1 teaspoon daily and gradually working up.  If stool studies negative can try Imodium.  Future considerations include fecal elastase, fecal fat, O&P, trial of low FODMAP diet.  Could consider additional cross-sectional imaging with MR enterography.    Plan:  -- Infectious stool studies and fecal calprotectin  -- Schedule upper endoscopy and colonoscopy -- biopsies for celiac, h.pylori, microscopic colitis  -- SIBO breath testing, if positive recommend course of Rifaxamin 550 mg TID x 14 days  -- Try famotidine up to twice daily for upper abdominal pain with eating  --  Start soluble fiber supplementation with Citrucel powder - this should be taken daily. Start with 1 teaspooon - 1/2 tablespoon mixed with large glass of water. Slowly increase dose by 1 teaspoon - 1/2 tablespoon every 1-2 weeks until desired stool consistency is achieved (up to 2-3 tablespoons per day).  -- If infectious stool studies negative can take Imodium as needed  -- Heat for left lower pain    Colorectal cancer screening: No personal or family history of colon cancer or advanced colon polyps, current guidelines recommend starting screening at age 45 -- ordered today      RTC 3 months (after procedures)    Thank you for this consultation.  It was a pleasure to participate in the care of this patient; please contact us with any further questions.     70 Minutes was spent on the date of the encounter during chart review, history and exam, documentation, and further activities as noted       Lidia Guerra PA-C, RD  Division of Gastroenterology, Hepatology & Nutrition  [facility]        HPI  Amina Roca is a 43 year old female with a history of migraine, HTN, bicuspid valve,  s/p hysterectomy in 2022 (fallopian tubes and ovaries remain) who presents for evaluation of LLQ pain an SIBO. They are new to the [facility] GI clinic and this is my first encounter with the patient.      with long history of GI concerns with recent recurrence.  She reports that in 2010 after she gave birth to her son she had significant upper abdominal pain with all oral intake which led to 60 to 80 pound weight loss.  She had GI workup in [place] that is not available for review in Care Everywhere today.  She reports that she had an upper endoscopy that showed what she thinks i was evidence of reflux.  Normal barium swallow and HIDA scan.  She started Nexium with some improvement in her symptoms though continued and had to follow strict diet of chicken and rice and cooked vegetables.  Than 7 years ago met with another GI provider who did hydrogen breath testing and diagnosed her with SIBO.  She had course of antibiotics for 30 days followed by probiotics for 60 days and had significant improvement in her symptoms.    In September she had onset of severe left lower quadrant pain while out riding a bike and her neighbor had.  She reports that pain was 10 out of 10 at that time.  Pain was so intense she could not stand or walk, and felt nauseated.  Gradually over the week the pain decreased, however has never gone away.  She reports that it remains in awareness, most often oh 1-2 out of 10.  If she is having a bad GI day pain will increase.  Pain increases with palpation.  No mass.  No radiation.  Walking seems to make worse. heating pad helps.  No impact with eating or bowel movements.    Since onset of pain she has had loose stools.  Average of 2-3 stools per day, however days with 6-7.  Always liquid to mush.  No hard stools.  Occasional 1 day between bowel movements.  Urgent more than 50% of the time.  Bowel movements will wake up from sleep 2-3 times per week.  No blood in stool or melena.  Occasionally oily  stools.     Over the last couple weeks she has again started to have upper GI discomfort similar to after her previous pregnancy with postprandial pain.  She reports as soon as food hits her stomach she gets cramps then dull aches for 2 to 3 hours.  Not nearly as severe as previous though it is reminiscent.  She has not started any acid reducing medicine.  No recent weight loss, eating smaller more frequent meals which has been helpful.  Big meal seem to aggravate more.  Does feel full quickly.  Denies acid reflux or heartburn.  No dysphagia or odynophagia.  No nausea or vomiting.  No bloating.    She was seen by primary care. CT A/P (with contrast) done 9/12/23 for LLQ pain. Showed mildly complex left adnexal cystic lesion. This was thought to be cause of pain, however follow up pelvic US 9/12/23 was normal.  Recent CRP and TSH normal.  Of note on their exam psitive FADIR.    Tried imodium, BRAT diet, incrasing fluid, metamucil, probiotics, Tylenol, ibuprofen without improvement in her symptoms.  No regular NSAID use currently or previously.    Of note she was recently diagnosed with suspected peanut butter allergy which is something she previously tolerated.  Reports difficulty breathing with ingestion.  She then tried to make peanut butter cookies for her children and got hives when touching dough and again difficulty breathing when cookies were baking.  She has allergist appointment coming up.      ROS:    No fevers or chills  No weight loss  No blurry vision, double vision or change in vision  No sore throat  No lymphadenopathy  No headache, paraesthesias, or weakness in a limb  No shortness of breath or wheezing  No chest pain or pressure  No arthralgias or myalgias  No rashes or skin changes  No odynophagia or dysphagia  No BRBPR, hematochezia, melena  No dysuria, frequency or urgency  No hot/cold intolerance or polyria  No anxiety or depression      PROBLEM LIST  Patient Active Problem List    Diagnosis Date  Noted    Bicuspid aortic valve with ascending aorta 4.0 to 4.5 cm in diameter 10/31/2023     Priority: Medium    H/O vaginal hysterectomy 10/22/2022     Priority: Medium    Hypertensive disorder 04/01/2019     Priority: Medium    Migraine 01/01/1990     Priority: Medium       PERTINENT PAST MEDICAL HISTORY:  Past Medical History:   Diagnosis Date    Migraine        PREVIOUS SURGERIES:  Past Surgical History:   Procedure Laterality Date    HYSTERECTOMY         PREVIOUS ENDOSCOPY:  EGD in 2010 in [place], no records available    ALLERGIES:     Allergies   Allergen Reactions    Nuts Shortness Of Breath, Itching, Swelling and Rash     peanuts    Penicillins Anaphylaxis    Shellfish Allergy Dermatitis, Diarrhea, Dizziness, Fatigue, Headache, Hives, Itching, Nausea, Palpitations, Shortness Of Breath and Visual Disturbance    Reglan [Metoclopramide] Other (See Comments)     Sweating       PERTINENT MEDICATIONS:    Current Outpatient Medications:     cloNIDine (CATAPRES) 0.2 MG tablet, , Disp: , Rfl:     CloNIDine HCl 0.17 MG TB24, TAKE 2 TABLETS BY MOUTH EVERY DAY, Disp: 180 tablet, Rfl: 0    EPINEPHrine (ANY BX GENERIC EQUIV) 0.3 MG/0.3ML injection 2-pack, Inject 0.3 mLs (0.3 mg) into the muscle as needed for anaphylaxis May repeat one time in 5-15 minutes if response to initial dose is inadequate., Disp: 2 each, Rfl: 0    losartan (COZAAR) 50 MG tablet, TAKE ONE TABLET BY MOUTH EVERY DAY, Disp: 30 tablet, Rfl: 0  Multivitamin OTC   No other NSAID/anticoagulation reported by patient.   No other OTC/herbal/supplements reported by patient.    SOCIAL HISTORY:    Tobacco: no  Alcohol: no alcohol  Drugs Use: no    Social History     Socioeconomic History    Marital status:      Spouse name: Not on file    Number of children: Not on file    Years of education: Not on file    Highest education level: Not on file   Occupational History    Not on file   Tobacco Use    Smoking status: Never    Smokeless tobacco: Never   Vaping  Use    Vaping Use: Never used   Substance and Sexual Activity    Alcohol use: Not on file    Drug use: Not on file    Sexual activity: Not on file   Other Topics Concern    Not on file   Social History Narrative    Not on file     Social Determinants of Health     Financial Resource Strain: Low Risk  (11/24/2023)    Financial Resource Strain     Within the past 12 months, have you or your family members you live with been unable to get utilities (heat, electricity) when it was really needed?: No   Food Insecurity: Low Risk  (11/24/2023)    Food Insecurity     Within the past 12 months, did you worry that your food would run out before you got money to buy more?: No     Within the past 12 months, did the food you bought just not last and you didn t have money to get more?: No   Transportation Needs: Low Risk  (11/24/2023)    Transportation Needs     Within the past 12 months, has lack of transportation kept you from medical appointments, getting your medicines, non-medical meetings or appointments, work, or from getting things that you need?: No   Physical Activity: Not on file   Stress: Not on file   Social Connections: Not on file   Interpersonal Safety: Low Risk  (10/4/2023)    Interpersonal Safety     Do you feel physically and emotionally safe where you currently live?: Yes     Within the past 12 months, have you been hit, slapped, kicked or otherwise physically hurt by someone?: No     Within the past 12 months, have you been humiliated or emotionally abused in other ways by your partner or ex-partner?: No   Housing Stability: Low Risk  (11/24/2023)    Housing Stability     Do you have housing? : Yes     Are you worried about losing your housing?: No         FAMILY HISTORY:  FH of CRC: no  FH of IBD: no  FH of celiac: no  No Colon/Panc/Esophageal/other GI CA. No IBD or Celiac Disease. No other Autoimmune, Liver, or Thyroid disease.    No family history on file.    Past/family/social history reviewed and no  changes    PHYSICAL EXAMINATION:  Constitutional: AAOx3, cooperative, pleasant, not dyspneic/diaphoretic, no acute distress  Vitals reviewed: There were no vitals taken for this visit.  Wt:   Wt Readings from Last 2 Encounters:   12/07/23 79.4 kg (175 lb)   11/27/23 79.4 kg (175 lb)      General appearance: Healthy appearing adult, in no acute distress  Eyes: Sclera anicteric, Pupils round and reactive to light  Ears, nose, mouth and throat: No obvious external lesions of ears and nose, Hearing intact  Neck: Symmetric, No obvious external lesions  Respiratory: Normal respiration, no use of accessory muscles   MSK: Gait normal  Skin: No rashes or jaundice   Psychiatric: Oriented to person, place and time, Appropriate mood and affect.         PERTINENT STUDIES:    Lab on 10/09/2023   Component Date Value Ref Range Status    Color Urine 10/09/2023 Yellow  Colorless, Straw, Light Yellow, Yellow Final    Appearance Urine 10/09/2023 Clear  Clear Final    Glucose Urine 10/09/2023 Negative  Negative mg/dL Final    Bilirubin Urine 10/09/2023 Negative  Negative Final    Ketones Urine 10/09/2023 Negative  Negative mg/dL Final    Specific Gravity Urine 10/09/2023 1.020  1.003 - 1.035 Final    Blood Urine 10/09/2023 Trace (A)  Negative Final    pH Urine 10/09/2023 7.0  5.0 - 7.0 Final    Protein Albumin Urine 10/09/2023 Negative  Negative mg/dL Final    Urobilinogen Urine 10/09/2023 0.2  0.2, 1.0 E.U./dL Final    Nitrite Urine 10/09/2023 Negative  Negative Final    Leukocyte Esterase Urine 10/09/2023 Negative  Negative Final    WBC Count 10/09/2023 7.9  4.0 - 11.0 10e3/uL Final    RBC Count 10/09/2023 4.58  3.80 - 5.20 10e6/uL Final    Hemoglobin 10/09/2023 14.1  11.7 - 15.7 g/dL Final    Hematocrit 10/09/2023 40.1  35.0 - 47.0 % Final    MCV 10/09/2023 88  78 - 100 fL Final    MCH 10/09/2023 30.8  26.5 - 33.0 pg Final    MCHC 10/09/2023 35.2  31.5 - 36.5 g/dL Final    RDW 10/09/2023 11.5  10.0 - 15.0 % Final    Platelet Count  10/09/2023 311  150 - 450 10e3/uL Final    % Neutrophils 10/09/2023 55  % Final    % Lymphocytes 10/09/2023 36  % Final    % Monocytes 10/09/2023 8  % Final    % Eosinophils 10/09/2023 2  % Final    % Basophils 10/09/2023 1  % Final    % Immature Granulocytes 10/09/2023 0  % Final    Absolute Neutrophils 10/09/2023 4.3  1.6 - 8.3 10e3/uL Final    Absolute Lymphocytes 10/09/2023 2.8  0.8 - 5.3 10e3/uL Final    Absolute Monocytes 10/09/2023 0.6  0.0 - 1.3 10e3/uL Final    Absolute Eosinophils 10/09/2023 0.1  0.0 - 0.7 10e3/uL Final    Absolute Basophils 10/09/2023 0.0  0.0 - 0.2 10e3/uL Final    Absolute Immature Granulocytes 10/09/2023 0.0  <=0.4 10e3/uL Final    RBC Urine 10/09/2023 0-2  0-2 /HPF /HPF Final    WBC Urine 10/09/2023 0-5  0-5 /HPF /HPF Final    Squamous Epithelials Urine 10/09/2023 Few (A)  None Seen /LPF Final    Amorphous Crystals Urine 10/09/2023 Few (A)  None Seen /HPF Final     CT ABDOMEN PELVIS W CONTRAST 9/12/2023 2:37 PM     CLINICAL HISTORY: Abdominal pain. Abdominal pain, left lower quadrant     TECHNIQUE: CT scan of the abdomen and pelvis was performed following  injection of IV contrast. Multiplanar reformats were obtained. Dose  reduction techniques were used.  CONTRAST: 86 mL Isovue-370     COMPARISON: None.     FINDINGS:   LOWER CHEST: Heart size within normal limits. Lung bases are clear.     HEPATOBILIARY: Hepatic steatosis. Subcentimeter hypoattenuating lesion  within the right hepatic lobe is too small to characterize but likely  represents a benign cyst. Focal fatty infiltration along the falciform  ligament. Unremarkable gallbladder. No biliary ductal dilatation.     PANCREAS: No significant mass, duct dilatation, or inflammatory  change.     SPLEEN: Normal size.     ADRENAL GLANDS: No significant nodules.     KIDNEYS/BLADDER: No significant mass, stones, or hydronephrosis.     BOWEL: No obstruction or inflammatory change. Normal appendix. Few  colonic diverticula without focal  inflammatory change.     PELVIC ORGANS: Left adnexal cystic lesion measuring 3 cm in size which  appears complex with minimal surrounding stranding. Right adnexa is  unremarkable. Hysterectomy.     ADDITIONAL FINDINGS: No abdominopelvic adenopathy. No fluid collection  within the hysterectomy bed     MUSCULOSKELETAL: Unremarkable.                                                                      IMPRESSION:   1.  Left adnexal cystic lesion which appears mildly complex with  minimal surrounding stranding. Recommend further evaluation with  pelvic ultrasound given patient's presentation of left lower quadrant  pain.  2.  Otherwise, no acute findings within the abdomen or pelvis.  3.  Hepatic steatosis.

## 2024-01-05 NOTE — LETTER
1/5/2024         RE: Amina Roca  [id number] [address]  [facility] [address]        Dear Colleague,    Thank you for referring your patient, Amina Roca, to the [facility]STRO[facility]OLOG[facility]INIC [place]. Please see a copy of my visit note below.      GI CLINIC VISIT    CC/REFERRING MD:  Andrew Kirk  REASON FOR CONSULTATION: LLQ pain, loose stools    ASSESSMENT/PLAN:  #Epigastric Pain  #LLQ Pain  #Loose Stools  #History of SIBO  Patient with history of onset of severe postprandial epigastric pain in 2010 that lasted for 7 to 8 years until diagnosed with SIBO 7 years ago with round of antibiotics and probiotics.  Of note she reports having EGD in 2010 that likely showed evidence of reflux, reports HIDA and barium swallow normal.  In the past several weeks postprandial epigastric pain has returned, though not as severe.  Additionally in September she had severe onset of left lower quadrant pain that is gradually improved without return of severe symptoms, however has not fully resolved.  CT AP showed left adnexal cyst, however ultrasound was negative.  Since onset of left-sided pain she has also had exclusively loose stools with urgency and occasional nocturnal stools.  No recent weight loss, blood in stool, melena, hematemesis, coffee-ground emesis.  Recent CRP and TSH normal.  Differential for symptoms includes recurrence of SIBO, reflux, gastritis, h.pylori, celiac, functional dyspepsia, IBS, IBD, chronic GI infection, pancreatic insufficiency.  LLQ pain possibly related to muscle injury with association with walking v ovary with prior CT finding, however US reassuring. Per primary care exam FADIR positive, consider referral to sports medicine if GI work up negative.  For GI workup we will start with stool studies for chronic GI infections and fecal calprotectin.  Will also pursue upper endoscopy given postprandial pain and colonoscopy given nocturnal stools (also due for surveillance given  age).  Given history of SIBO and similar symptoms with postprandial pain we will also pursue hydrogen breath testing.  For symptoms she can try famotidine up to twice daily for upper postprandial abdominal pain.  For her left lower quadrant pain, continue heat.  For her loose stools start low-dose soluble fiber supplement with Citrucel at 1 teaspoon daily and gradually working up.  If stool studies negative can try Imodium.  Future considerations include fecal elastase, fecal fat, O&P, trial of low FODMAP diet.  Could consider additional cross-sectional imaging with MR enterography.    Plan:  -- Infectious stool studies and fecal calprotectin  -- Schedule upper endoscopy and colonoscopy -- biopsies for celiac, h.pylori, microscopic colitis  -- SIBO breath testing, if positive recommend course of Rifaxamin 550 mg TID x 14 days  -- Try famotidine up to twice daily for upper abdominal pain with eating  --  Start soluble fiber supplementation with Citrucel powder - this should be taken daily. Start with 1 teaspooon - 1/2 tablespoon mixed with large glass of water. Slowly increase dose by 1 teaspoon - 1/2 tablespoon every 1-2 weeks until desired stool consistency is achieved (up to 2-3 tablespoons per day).  -- If infectious stool studies negative can take Imodium as needed  -- Heat for left lower pain    Colorectal cancer screening: No personal or family history of colon cancer or advanced colon polyps, current guidelines recommend starting screening at age 45 -- ordered today      RTC 3 months (after procedures)    Thank you for this consultation.  It was a pleasure to participate in the care of this patient; please contact us with any further questions.     70 Minutes was spent on the date of the encounter during chart review, history and exam, documentation, and further activities as noted       Lidia Guerra PA-C, RD  Division of Gastroenterology, Hepatology & Nutrition  [facility]        CANDIS Huynh  Abelino is a 43 year old female with a history of migraine, HTN, bicuspid valve, s/p hysterectomy in 2022 (fallopian tubes and ovaries remain) who presents for evaluation of LLQ pain an SIBO. They are new to the [facility] GI clinic and this is my first encounter with the patient.      with long history of GI concerns with recent recurrence.  She reports that in 2010 after she gave birth to her son she had significant upper abdominal pain with all oral intake which led to 60 to 80 pound weight loss.  She had GI workup in [place] that is not available for review in Care Everywhere today.  She reports that she had an upper endoscopy that showed what she thinks i was evidence of reflux.  Normal barium swallow and HIDA scan.  She started Nexium with some improvement in her symptoms though continued and had to follow strict diet of chicken and rice and cooked vegetables.  Than 7 years ago met with another GI provider who did hydrogen breath testing and diagnosed her with SIBO.  She had course of antibiotics for 30 days followed by probiotics for 60 days and had significant improvement in her symptoms.    In September she had onset of severe left lower quadrant pain while out riding a bike and her neighbor had.  She reports that pain was 10 out of 10 at that time.  Pain was so intense she could not stand or walk, and felt nauseated.  Gradually over the week the pain decreased, however has never gone away.  She reports that it remains in awareness, most often oh 1-2 out of 10.  If she is having a bad GI day pain will increase.  Pain increases with palpation.  No mass.  No radiation.  Walking seems to make worse. heating pad helps.  No impact with eating or bowel movements.    Since onset of pain she has had loose stools.  Average of 2-3 stools per day, however days with 6-7.  Always liquid to mush.  No hard stools.  Occasional 1 day between bowel movements.  Urgent more than 50% of the time.  Bowel movements will wake up from  sleep 2-3 times per week.  No blood in stool or melena.  Occasionally oily stools.     Over the last couple weeks she has again started to have upper GI discomfort similar to after her previous pregnancy with postprandial pain.  She reports as soon as food hits her stomach she gets cramps then dull aches for 2 to 3 hours.  Not nearly as severe as previous though it is reminiscent.  She has not started any acid reducing medicine.  No recent weight loss, eating smaller more frequent meals which has been helpful.  Big meal seem to aggravate more.  Does feel full quickly.  Denies acid reflux or heartburn.  No dysphagia or odynophagia.  No nausea or vomiting.  No bloating.    She was seen by primary care. CT A/P (with contrast) done 9/12/23 for LLQ pain. Showed mildly complex left adnexal cystic lesion. This was thought to be cause of pain, however follow up pelvic US 9/12/23 was normal.  Recent CRP and TSH normal.  Of note on their exam psitive FADIR.    Tried imodium, BRAT diet, incrasing fluid, metamucil, probiotics, Tylenol, ibuprofen without improvement in her symptoms.  No regular NSAID use currently or previously.    Of note she was recently diagnosed with suspected peanut butter allergy which is something she previously tolerated.  Reports difficulty breathing with ingestion.  She then tried to make peanut butter cookies for her children and got hives when touching dough and again difficulty breathing when cookies were baking.  She has allergist appointment coming up.      ROS:    No fevers or chills  No weight loss  No blurry vision, double vision or change in vision  No sore throat  No lymphadenopathy  No headache, paraesthesias, or weakness in a limb  No shortness of breath or wheezing  No chest pain or pressure  No arthralgias or myalgias  No rashes or skin changes  No odynophagia or dysphagia  No BRBPR, hematochezia, melena  No dysuria, frequency or urgency  No hot/cold intolerance or polyria  No anxiety or  depression      PROBLEM LIST  Patient Active Problem List    Diagnosis Date Noted    Bicuspid aortic valve with ascending aorta 4.0 to 4.5 cm in diameter 10/31/2023     Priority: Medium    H/O vaginal hysterectomy 10/22/2022     Priority: Medium    Hypertensive disorder 04/01/2019     Priority: Medium    Migraine 01/01/1990     Priority: Medium       PERTINENT PAST MEDICAL HISTORY:  Past Medical History:   Diagnosis Date    Migraine        PREVIOUS SURGERIES:  Past Surgical History:   Procedure Laterality Date    HYSTERECTOMY         PREVIOUS ENDOSCOPY:  EGD in 2010 in [place], no records available    ALLERGIES:     Allergies   Allergen Reactions    Nuts Shortness Of Breath, Itching, Swelling and Rash     peanuts    Penicillins Anaphylaxis    Shellfish Allergy Dermatitis, Diarrhea, Dizziness, Fatigue, Headache, Hives, Itching, Nausea, Palpitations, Shortness Of Breath and Visual Disturbance    Reglan [Metoclopramide] Other (See Comments)     Sweating       PERTINENT MEDICATIONS:    Current Outpatient Medications:     cloNIDine (CATAPRES) 0.2 MG tablet, , Disp: , Rfl:     CloNIDine HCl 0.17 MG TB24, TAKE 2 TABLETS BY MOUTH EVERY DAY, Disp: 180 tablet, Rfl: 0    EPINEPHrine (ANY BX GENERIC EQUIV) 0.3 MG/0.3ML injection 2-pack, Inject 0.3 mLs (0.3 mg) into the muscle as needed for anaphylaxis May repeat one time in 5-15 minutes if response to initial dose is inadequate., Disp: 2 each, Rfl: 0    losartan (COZAAR) 50 MG tablet, TAKE ONE TABLET BY MOUTH EVERY DAY, Disp: 30 tablet, Rfl: 0  Multivitamin OTC   No other NSAID/anticoagulation reported by patient.   No other OTC/herbal/supplements reported by patient.    SOCIAL HISTORY:    Tobacco: no  Alcohol: no alcohol  Drugs Use: no    Social History     Socioeconomic History    Marital status:      Spouse name: Not on file    Number of children: Not on file    Years of education: Not on file    Highest education level: Not on file   Occupational History    Not on file    Tobacco Use    Smoking status: Never    Smokeless tobacco: Never   Vaping Use    Vaping Use: Never used   Substance and Sexual Activity    Alcohol use: Not on file    Drug use: Not on file    Sexual activity: Not on file   Other Topics Concern    Not on file   Social History Narrative    Not on file     Social Determinants of Health     Financial Resource Strain: Low Risk  (11/24/2023)    Financial Resource Strain     Within the past 12 months, have you or your family members you live with been unable to get utilities (heat, electricity) when it was really needed?: No   Food Insecurity: Low Risk  (11/24/2023)    Food Insecurity     Within the past 12 months, did you worry that your food would run out before you got money to buy more?: No     Within the past 12 months, did the food you bought just not last and you didn t have money to get more?: No   Transportation Needs: Low Risk  (11/24/2023)    Transportation Needs     Within the past 12 months, has lack of transportation kept you from medical appointments, getting your medicines, non-medical meetings or appointments, work, or from getting things that you need?: No   Physical Activity: Not on file   Stress: Not on file   Social Connections: Not on file   Interpersonal Safety: Low Risk  (10/4/2023)    Interpersonal Safety     Do you feel physically and emotionally safe where you currently live?: Yes     Within the past 12 months, have you been hit, slapped, kicked or otherwise physically hurt by someone?: No     Within the past 12 months, have you been humiliated or emotionally abused in other ways by your partner or ex-partner?: No   Housing Stability: Low Risk  (11/24/2023)    Housing Stability     Do you have housing? : Yes     Are you worried about losing your housing?: No         FAMILY HISTORY:  FH of CRC: no  FH of IBD: no  FH of celiac: no  No Colon/Panc/Esophageal/other GI CA. No IBD or Celiac Disease. No other Autoimmune, Liver, or Thyroid  disease.    No family history on file.    Past/family/social history reviewed and no changes    PHYSICAL EXAMINATION:  Constitutional: AAOx3, cooperative, pleasant, not dyspneic/diaphoretic, no acute distress  Vitals reviewed: There were no vitals taken for this visit.  Wt:   Wt Readings from Last 2 Encounters:   12/07/23 79.4 kg (175 lb)   11/27/23 79.4 kg (175 lb)      General appearance: Healthy appearing adult, in no acute distress  Eyes: Sclera anicteric, Pupils round and reactive to light  Ears, nose, mouth and throat: No obvious external lesions of ears and nose, Hearing intact  Neck: Symmetric, No obvious external lesions  Respiratory: Normal respiration, no use of accessory muscles   MSK: Gait normal  Skin: No rashes or jaundice   Psychiatric: Oriented to person, place and time, Appropriate mood and affect.         PERTINENT STUDIES:    Lab on 10/09/2023   Component Date Value Ref Range Status    Color Urine 10/09/2023 Yellow  Colorless, Straw, Light Yellow, Yellow Final    Appearance Urine 10/09/2023 Clear  Clear Final    Glucose Urine 10/09/2023 Negative  Negative mg/dL Final    Bilirubin Urine 10/09/2023 Negative  Negative Final    Ketones Urine 10/09/2023 Negative  Negative mg/dL Final    Specific Gravity Urine 10/09/2023 1.020  1.003 - 1.035 Final    Blood Urine 10/09/2023 Trace (A)  Negative Final    pH Urine 10/09/2023 7.0  5.0 - 7.0 Final    Protein Albumin Urine 10/09/2023 Negative  Negative mg/dL Final    Urobilinogen Urine 10/09/2023 0.2  0.2, 1.0 E.U./dL Final    Nitrite Urine 10/09/2023 Negative  Negative Final    Leukocyte Esterase Urine 10/09/2023 Negative  Negative Final    WBC Count 10/09/2023 7.9  4.0 - 11.0 10e3/uL Final    RBC Count 10/09/2023 4.58  3.80 - 5.20 10e6/uL Final    Hemoglobin 10/09/2023 14.1  11.7 - 15.7 g/dL Final    Hematocrit 10/09/2023 40.1  35.0 - 47.0 % Final    MCV 10/09/2023 88  78 - 100 fL Final    MCH 10/09/2023 30.8  26.5 - 33.0 pg Final    MCHC 10/09/2023 35.2   31.5 - 36.5 g/dL Final    RDW 10/09/2023 11.5  10.0 - 15.0 % Final    Platelet Count 10/09/2023 311  150 - 450 10e3/uL Final    % Neutrophils 10/09/2023 55  % Final    % Lymphocytes 10/09/2023 36  % Final    % Monocytes 10/09/2023 8  % Final    % Eosinophils 10/09/2023 2  % Final    % Basophils 10/09/2023 1  % Final    % Immature Granulocytes 10/09/2023 0  % Final    Absolute Neutrophils 10/09/2023 4.3  1.6 - 8.3 10e3/uL Final    Absolute Lymphocytes 10/09/2023 2.8  0.8 - 5.3 10e3/uL Final    Absolute Monocytes 10/09/2023 0.6  0.0 - 1.3 10e3/uL Final    Absolute Eosinophils 10/09/2023 0.1  0.0 - 0.7 10e3/uL Final    Absolute Basophils 10/09/2023 0.0  0.0 - 0.2 10e3/uL Final    Absolute Immature Granulocytes 10/09/2023 0.0  <=0.4 10e3/uL Final    RBC Urine 10/09/2023 0-2  0-2 /HPF /HPF Final    WBC Urine 10/09/2023 0-5  0-5 /HPF /HPF Final    Squamous Epithelials Urine 10/09/2023 Few (A)  None Seen /LPF Final    Amorphous Crystals Urine 10/09/2023 Few (A)  None Seen /HPF Final     CT ABDOMEN PELVIS W CONTRAST 9/12/2023 2:37 PM     CLINICAL HISTORY: Abdominal pain. Abdominal pain, left lower quadrant     TECHNIQUE: CT scan of the abdomen and pelvis was performed following  injection of IV contrast. Multiplanar reformats were obtained. Dose  reduction techniques were used.  CONTRAST: 86 mL Isovue-370     COMPARISON: None.     FINDINGS:   LOWER CHEST: Heart size within normal limits. Lung bases are clear.     HEPATOBILIARY: Hepatic steatosis. Subcentimeter hypoattenuating lesion  within the right hepatic lobe is too small to characterize but likely  represents a benign cyst. Focal fatty infiltration along the falciform  ligament. Unremarkable gallbladder. No biliary ductal dilatation.     PANCREAS: No significant mass, duct dilatation, or inflammatory  change.     SPLEEN: Normal size.     ADRENAL GLANDS: No significant nodules.     KIDNEYS/BLADDER: No significant mass, stones, or hydronephrosis.     BOWEL: No  obstruction or inflammatory change. Normal appendix. Few  colonic diverticula without focal inflammatory change.     PELVIC ORGANS: Left adnexal cystic lesion measuring 3 cm in size which  appears complex with minimal surrounding stranding. Right adnexa is  unremarkable. Hysterectomy.     ADDITIONAL FINDINGS: No abdominopelvic adenopathy. No fluid collection  within the hysterectomy bed     MUSCULOSKELETAL: Unremarkable.                                                                      IMPRESSION:   1.  Left adnexal cystic lesion which appears mildly complex with  minimal surrounding stranding. Recommend further evaluation with  pelvic ultrasound given patient's presentation of left lower quadrant  pain.  2.  Otherwise, no acute findings within the abdomen or pelvis.  3.  Hepatic steatosis.      Again, thank you for allowing me to participate in the care of your patient.      Sincerely,    Lidia Guerra PA-C

## 2024-01-12 DIAGNOSIS — I10 HYPERTENSION, UNSPECIFIED TYPE: ICD-10-CM

## 2024-01-12 RX ORDER — CLONIDINE 0.17 MG/1
2 TABLET, EXTENDED RELEASE ORAL DAILY
Qty: 180 TABLET | Refills: 0 | Status: SHIPPED | OUTPATIENT
Start: 2024-01-12 | End: 2024-02-05

## 2024-01-15 ENCOUNTER — TELEPHONE (OUTPATIENT)
Dept: CARDIOLOGY | Facility: CLINIC | Age: 44
End: 2024-01-15
Payer: OTHER GOVERNMENT

## 2024-01-15 NOTE — TELEPHONE ENCOUNTER
[facility] Call Center    Phone Message    May a detailed message be left on voicemail: yes     Reason for Call: Other: Elena from oral surgery faxed over clearance paper work that needs to be faxed back before Friday and they have not heard back yet and would like a call back to discuss     Action Taken: Other: Cardio    Travel Screening: Not Applicable

## 2024-01-15 NOTE — TELEPHONE ENCOUNTER
Paperwork was not received on this patient regarding dental clearance.  Returned call, provided correct fax number, advised that provider is out of office until tomorrow.  Will complete paperwork and fax back before Friday.    Reina Ford RN on 1/15/2024 at 12:07 PM

## 2024-01-16 ENCOUNTER — TELEPHONE (OUTPATIENT)
Dept: GASTROENTEROLOGY | Facility: CLINIC | Age: 44
End: 2024-01-16
Payer: OTHER GOVERNMENT

## 2024-01-16 DIAGNOSIS — R19.5 LOOSE STOOLS: Primary | ICD-10-CM

## 2024-01-16 DIAGNOSIS — Z91.018 ALLERGY TO SOY: ICD-10-CM

## 2024-01-16 NOTE — TELEPHONE ENCOUNTER
"Endoscopy Scheduling Screen    Have you had a positive Covid test in the last 14 days?  No    Are you active on MyChart?   Yes    What insurance is in the chart?  Other:  [employer]    Ordering/Referring Provider: Mari   (If ordering provider performs procedure, schedule with ordering provider unless otherwise instructed. )    BMI: Estimated body mass index is 28.29 kg/m  as calculated from the following:    Height as of 1/5/24: 1.651 m (5' 5\").    Weight as of 1/5/24: 77.1 kg (170 lb).     Sedation Ordered  moderate sedation.   If patient BMI > 50 do not schedule in [facility].    If patient BMI > 45 do not schedule at [facility].    Are you taking methadone or Suboxone?  No    Are you taking any prescription medications for pain 3 or more times per week?   NO - No RN review required.    Do you have a history of malignant hyperthermia or adverse reaction to anesthesia?  No    (Females) Are you currently pregnant?   No     Have you been diagnosed or told you have pulmonary hypertension?   No    Do you have an LVAD?  No    Have you been told you have moderate to severe sleep apnea?  No    Have you been told you have COPD, asthma, or any other lung disease?  No    Do you have any heart conditions?  Yes -bicuspid aortic valve    In the past 6 months, have you had any hospitalizations for heart related issues including cardiomyopathy, heart attack, or stent placement?  No-but did have ED visit 11/16/24 for chest pain, but was not admitted    Do you have any implantable devices in your body (pacemaker, ICD)?  No    Do you take nitroglycerine?  No    Have you ever had an organ transplant?   No    Have you ever had or are you awaiting a heart or lung transplant?   No    Have you had a stroke or transient ischemic attack (TIA aka \"mini stroke\" in the last 6 months?   No    Have you been diagnosed with or been told you have cirrhosis of the liver?   No    Are you currently on dialysis?   No    Do you need assistance " "transferring?   No    BMI: Estimated body mass index is 28.29 kg/m  as calculated from the following:    Height as of 1/5/24: 1.651 m (5' 5\").    Weight as of 1/5/24: 77.1 kg (170 lb).     Is patients BMI > 40 and scheduling location [facility]?  No    Do you take an injectable medication for weight loss or diabetes (excluding insulin)?  No    Do you take the medication Naltrexone?  No    Do you take blood thinners?  No       Prep   Are you currently on dialysis or do you have chronic kidney disease?  No    Do you have a diagnosis of diabetes?  No    Do you have a diagnosis of cystic fibrosis (CF)?  No    On a regular basis do you go 3 -5 days between bowel movements?  No    BMI > 40?  No    Preferred Pharmacy:    [id number] - [place], [place] - [address] [address] & [facility]  [address]  [facility] [address]  Phone: [phone] Fax: [phone]      Final Scheduling Details   Colonoscopy prep sent?  Standard MiraLAX    Procedure scheduled  Colonoscopy / Upper endoscopy (EGD) and HBT-3/12/24    Surgeon:  Luis     Date of procedure:  4/24/24     Pre-OP / PAC:   No - Not required for this site.    Location  [facility] - Patient preference.    Sedation   Moderate Sedation - Per order.      Patient Reminders:   You will receive a call from a Nurse to review instructions and health history.  This assessment must be completed prior to your procedure.  Failure to complete the Nurse assessment may result in the procedure being cancelled.      On the day of your procedure, please designate an adult(s) who can drive you home stay with you for the next 24 hours. The medicines used in the exam will make you sleepy. You will not be able to drive.      You cannot take public transportation, ride share services, or non-medical taxi service without a responsible caregiver.  Medical transport services are allowed with the requirement that a responsible caregiver will receive you at your " destination.  We require that drivers and caregivers are confirmed prior to your procedure.

## 2024-01-16 NOTE — TELEPHONE ENCOUNTER
[facility] Call Center    Phone Message    May a detailed message be left on voicemail: yes     Reason for Call: Other: Jeanette with Oral surgery called to check if dental clearance paperwork was received from the fax they sent yesterday. Jeanette stated patient is scheduled to have tooth extraction on the morning of 01/19. Needing clearance paperwork to be completed, and a pre-med prior to this. Wanting to know if this can be sent in by Thursday 01/18 the latest, so everything can be reviewed prior to patient's appt. Please call back at [phone] if paperwork has not been received. If paperwork has been received, please complete and fax back to [phone]. Thank you!     Action Taken: Other: Cardiology    Travel Screening: Not Applicable    Thank you!  [facility]

## 2024-01-23 RX ORDER — SODIUM, POTASSIUM,MAG SULFATES 17.5-3.13G
SOLUTION, RECONSTITUTED, ORAL ORAL
Qty: 354 ML | Refills: 0 | Status: SHIPPED | OUTPATIENT
Start: 2024-01-23 | End: 2024-04-02

## 2024-01-23 NOTE — TELEPHONE ENCOUNTER
[employer] message received from patient regarding soy allergy and contraindication for PEG 3350.     Bowel prep recommendation - Suprep.     Updated instructions sent via [employer] and Bowel prep has been sent to    [facility] #[id number] - [place], [place] - [address] [facility] AT [address] [address] & [facility]    Elena Pugh RN  Endoscopy Procedure Pre Assessment RN  [phone] option 4

## 2024-02-05 ENCOUNTER — VIRTUAL VISIT (OUTPATIENT)
Dept: FAMILY MEDICINE | Facility: CLINIC | Age: 44
End: 2024-02-05
Payer: OTHER GOVERNMENT

## 2024-02-05 DIAGNOSIS — G43.E09 CHRONIC MIGRAINE WITH AURA WITHOUT STATUS MIGRAINOSUS, NOT INTRACTABLE: Primary | ICD-10-CM

## 2024-02-05 DIAGNOSIS — I10 HYPERTENSION, UNSPECIFIED TYPE: ICD-10-CM

## 2024-02-05 PROCEDURE — 99214 OFFICE O/P EST MOD 30 MIN: CPT | Mod: 95 | Performed by: STUDENT IN AN ORGANIZED HEALTH CARE EDUCATION/TRAINING PROGRAM

## 2024-02-05 RX ORDER — CLONIDINE HYDROCHLORIDE 0.2 MG/1
0.2 TABLET ORAL 2 TIMES DAILY
Qty: 180 TABLET | Refills: 1 | Status: SHIPPED | OUTPATIENT
Start: 2024-02-05 | End: 2024-08-12

## 2024-02-05 NOTE — PROGRESS NOTES
"Amina is a 43 year old who is being evaluated via a billable video visit.      How would you like to obtain your AVS? MyChart  If the video visit is dropped, the invitation should be resent by: Text to cell phone: [phone]  Will anyone else be joining your video visit? No    Assessment & Plan     Chronic migraine with aura without status migrainosus, not intractable  Hypertension, unspecified type  Migraines w/ aura returning after transition from IR to ER clonidine although was ER was effective to prevent rebound tachycardia. Discussed few options including trial of verapamil. Patient would like to resume IR clonidine. Sent in prescription. Will need to monitor HR and BP. If BP elevated, consider additional therapy.   - cloNIDine (CATAPRES) 0.2 MG tablet  Dispense: 180 tablet; Refill: 1    BMI  Estimated body mass index is 28.29 kg/m  as calculated from the following:    Height as of 1/5/24: 1.651 m (5' 5\").    Weight as of 1/5/24: 77.1 kg (170 lb).     Follow up in 1-3 months for annual physical and repeat BP    Andrew Kirk MD  [facility]  2/5/2024    Subjective   Amina is a 43 year old, presenting for the following health issues:  Migraine Mgmt        2/5/2024    10:34 AM   Additional Questions   Roomed by brandon     History of Present Illness       Headaches:   Since the patient's last clinic visit, headaches are: worsened  The patient is getting headaches:  Daily  She is not able to do normal daily activities when she has a migraine.  The patient is taking the following rescue/relief medications:  Ibuprofen (Advil, Motrin) and Tylenol   Patient states \"The relief is inconsistent\" from the rescue/relief medications.   The patient is taking the following medications to prevent migraines:  Other  In the past 4 weeks, the patient has gone to an Urgent Care or Emergency Room 0 times times due to headaches.    She eats 2-3 servings of fruits and vegetables daily.She consumes 1 sweetened " beverage(s) daily.She exercises with enough effort to increase her heart rate 20 to 29 minutes per day.  She exercises with enough effort to increase her heart rate 3 or less days per week.   She is taking medications regularly.     Headaches  Switched to ER clonidine which was very helpful for HR control.  Now getting ocular migraines 2-3x per day. Every day for the past month.   This started just a couple weeks after the transition to ER from IR  Interestingly her ocular migraines now start in the L eye and then progress to R eye and last about 40minutes  Before the ocular migraines would start in R eye and then progress to L eye and last 20 minutes  When on IR clonidine, would only get 1-2 ocular migraines per year.   Does endorse nausea but no vomiting. Occurs anytime. Not solely at night or in the AMs        Objective       Vitals:  No vitals were obtained today due to virtual visit.    Physical Exam   GENERAL: alert and no distress  EYES: Eyes grossly normal to inspection.  No discharge or erythema, or obvious scleral/conjunctival abnormalities.  RESP: No audible wheeze, cough, or visible cyanosis.    SKIN: Visible skin clear. No significant rash, abnormal pigmentation or lesions.  NEURO: Cranial nerves grossly intact.  Mentation and speech appropriate for age.  PSYCH: Appropriate affect, tone, and pace of words    Video-Visit Details    Type of service:  Video Visit     Originating Location (pt. Location): Home  Distant Location (provider location):  On-site  Platform used for Video Visit: Yuval  Signed Electronically by: Andrew Kirk MD

## 2024-03-05 ENCOUNTER — TELEPHONE (OUTPATIENT)
Dept: GASTROENTEROLOGY | Facility: CLINIC | Age: 44
End: 2024-03-05
Payer: OTHER GOVERNMENT

## 2024-03-05 NOTE — TELEPHONE ENCOUNTER
Left message for patient regarding upcoming HBT. Sent instructions through [employer] as well 1/16/2024 and read by patient on 2/13/2024.    Laura Dominguez LPN

## 2024-03-11 ENCOUNTER — LAB (OUTPATIENT)
Dept: LAB | Facility: CLINIC | Age: 44
End: 2024-03-11
Payer: OTHER GOVERNMENT

## 2024-03-11 ENCOUNTER — OFFICE VISIT (OUTPATIENT)
Dept: ALLERGY | Facility: CLINIC | Age: 44
End: 2024-03-11
Payer: OTHER GOVERNMENT

## 2024-03-11 VITALS
SYSTOLIC BLOOD PRESSURE: 128 MMHG | DIASTOLIC BLOOD PRESSURE: 77 MMHG | BODY MASS INDEX: 28.46 KG/M2 | OXYGEN SATURATION: 100 % | WEIGHT: 171 LBS | HEART RATE: 92 BPM

## 2024-03-11 DIAGNOSIS — T78.1XXA ADVERSE FOOD REACTION, INITIAL ENCOUNTER: ICD-10-CM

## 2024-03-11 DIAGNOSIS — T78.1XXA ADVERSE FOOD REACTION, INITIAL ENCOUNTER: Primary | ICD-10-CM

## 2024-03-11 DIAGNOSIS — T78.1XXA ALLERGIC REACTION TO PEANUT: ICD-10-CM

## 2024-03-11 PROCEDURE — 36415 COLL VENOUS BLD VENIPUNCTURE: CPT

## 2024-03-11 PROCEDURE — 99203 OFFICE O/P NEW LOW 30 MIN: CPT | Mod: 25 | Performed by: INTERNAL MEDICINE

## 2024-03-11 PROCEDURE — 95004 PERQ TESTS W/ALRGNC XTRCS: CPT | Performed by: INTERNAL MEDICINE

## 2024-03-11 PROCEDURE — 86003 ALLG SPEC IGE CRUDE XTRC EA: CPT

## 2024-03-11 NOTE — PROGRESS NOTES
Per provider verbal order, placed Shellfish Panel and peanut panel scratch test.  Verbal consent was obtained by MD prior to procedure.  Once panels were placed, patient was monitored for 15 minutes in clinic.  Provider read test after 15 minutes.  Pt tolerated procedure well.  All questions and concerns were addressed at office visit.        SHELTON Gillis

## 2024-03-11 NOTE — PATIENT INSTRUCTIONS
Avoid current foods until blood tests are available.  Unable to identify a food allergy based on skin testing.  Depending on the blood test results may consider an oral allergy challenge in the office.  Zyrtec 10 mg as needed for mild allergy symptoms.  Epipen for more significant symptoms.      Allergy Staff Appt Hours Shot Hours Location       Physician   Donald Castillo MD      Support Staff   ROSEMARY Abdul MA Emily J., MA      Mondays Tuesdays Thursdays and Fridays:      [place] 7-5 Wednesdays         Close                Mondays, Tuesdays and Fridays:  7:20 - 3:40              [facility]  [address]  [place] [address]  Allergy appointment  line: [phone]    Pulmonary Function Scheduling:  [place]: [phone]

## 2024-03-11 NOTE — PROGRESS NOTES
Amina Roca was seen in the Allergy Clinic at [facility].    Amina Roca is a 43 year old female being seen today at the request of Andrew Kirk MD/[facility] in consultation for Allergic reaction to peanut as well as symptoms associated with eating banana, egg, tree nuts and shellfish.  She has not had known symptoms with tree nuts but avoids since the reaction to the peanut.    In October she was eating peanut butter and developed nausea, shortness of breath, erythema of her feet as well as palms as well as itching of the feet and palms.    At that point she did not require any treatment but did avoid peanut and tree nuts.  EpiPen was prescribed at that point.  Subsequently she was making peanut butter cookies without touching the dough with her hands and developed symptoms when they were baking in the oven with skin itching.    While eating a banana recently she developed a throat closing sensation without any other symptoms such as hives.  Claritin did provide some benefit.  That lasted 15 to 20 minutes.    Eggs now cause headaches.  2 years ago she ate shrimp and did develop dizziness and nausea with hives.      Past Medical History:   Diagnosis Date    Migraine      No family history on file.  Past Surgical History:   Procedure Laterality Date    HYSTERECTOMY         ENVIRONMENTAL HISTORY:   Pets inside the house include 1 dog(s).  Do you smoke cigarettes or other recreational drugs? No There is/are 0 smokers living in the house. The house does not have a damp basement.     SOCIAL HISTORY:   Amina is employed as . She lives with her spouse and 3 kids.      Review of Systems      Current Outpatient Medications:     cloNIDine (CATAPRES) 0.2 MG tablet, Take 1 tablet (0.2 mg) by mouth 2 times daily, Disp: 180 tablet, Rfl: 1    EPINEPHrine (ANY BX GENERIC EQUIV) 0.3 MG/0.3ML injection 2-pack, Inject 0.3 mLs (0.3 mg) into the muscle as  needed for anaphylaxis May repeat one time in 5-15 minutes if response to initial dose is inadequate., Disp: 2 each, Rfl: 0    Na Sulfate-K Sulfate-Mg Sulf (SUPREP BOWEL PREP KIT) solution, Split dose 2 day Regimen: The evening before you procedure: dilute one bottle with water to a total volume of 16 oz. (up to fill line).  At 5 pm, drink the entire amount.  Drink 32 ounces of water over the next hour. The morning of your procedure repeat both steps above using the second bottle.  Start five hours before you procedure and complete the prep at least three hours before you arrive., Disp: 354 mL, Rfl: 0  Allergies   Allergen Reactions    Nuts Shortness Of Breath, Itching, Swelling and Rash     peanuts    Penicillins Anaphylaxis    Shellfish Allergy Dermatitis, Diarrhea, Dizziness, Fatigue, Headache, Hives, Itching, Nausea, Palpitations, Shortness Of Breath and Visual Disturbance    Reglan [Metoclopramide] Other (See Comments)     Sweating         EXAM:   /77   Pulse 92   Wt 77.6 kg (171 lb)   SpO2 100%   BMI 28.46 kg/m      Physical Exam    Constitutional:       General: She is not in acute distress.     Appearance: Normal appearance. She is not ill-appearing.   HENT:      Head: Normocephalic and atraumatic.      Nose: Mild turbinate hypertrophy bilaterally     Mouth/Throat:      Mouth: Mucous membranes are moist.      Pharynx: Oropharynx is clear. No posterior oropharyngeal erythema.   Eyes:      General:         Right eye: No discharge.         Left eye: No discharge.   Cardiovascular:      Rate and Rhythm: Normal rate and regular rhythm.      Heart sounds: Normal heart sounds.   Pulmonary:      Effort: Pulmonary effort is normal.      Breath sounds: Normal breath sounds. No wheezing or rhonchi.   Skin:     General: Skin is warm.      Findings: No erythema or rash.   Neurological:      General: No focal deficit present.      Mental Status: She is alert. Mental status is at baseline.   Psychiatric:          Mood and Affect: Mood normal.         Behavior: Behavior normal.      WORKUP: Skin testing was negative to all foods tested.    ENVIRONMENTAL PERCUTANEOUS SKIN TESTING: ADULT      3/11/2024     9:00 AM   Section Environmental   Consent Y   Ordering Physician Dr. Castillo   Interpreting Physician Dr. Castillo   Testing Technician Elis   Location Back   Time start: 09:37   Time End: 09:52   Birch Mix (W/F in millimeters) 0   Ragweed Mix* ALK (W/F in millimeters) 0         FOOD ALLERGEN PERCUTANEOUS SKIN TESTING      3/11/2024     9:00 AM   Section Foods    Consent Y   Ordering Physician Dr. castillo   Interpreting Physician Dr. Castillo   Testing Technician Elis   Location Back   Time start: 09:37   Time End: 09:52   Positive Control: Histatrol*ALK 1 mg/ml 5/20   Negative Control: 50% Glycerin**[place] Yris 0   Peanut 1:20 (W/F in millimeters) 0   [place]  1:20 (W/F in millimeters) 0   Cashew  1:20 (W/F in millimeters) 0   Pecan  1:20 (W/F in millimeters) 0   Pistachio*ALK (1:10 w/v) 0   [place] 1:20 (W/F in millimeters) 0   Hazelnut ([place])  1:20 (W/F in millimeters) 0   Brazil Nut  1:20 (W/F in millimeters) 0   Egg White 1:20 (W/F in millimeters) 0   Banana  1:40 (W/F in millimeters) 0   Shrimp 1:20 (W/F in millimeters) 0   Lobster 1:20 (W/F in millimeters) 0   Crab 1:20 (W/F in millimeters) 0   Clam 1:20 (W/F in millimeters) 0   Oyster 1:20 (W/F in millimeters) 0   Scallops 1:20 (W/F in millimeters) 0   Soy 1:40 w/v 0       ASSESSMENT/PLAN:  Amina Roca is a 43 year old female seen today for food allergy evaluation.  Skin testing was negative.  Will order blood testing to confirm these results.  Her symptoms were somewhat atypical for the banana and egg reaction.  However the peanut butter reaction sounded significant as well as the shellfish.  Depending on the results may consider an oral challenge in the office.    Urgent ragweed were also added to evaluate for oral allergy syndrome which can be associate with  peanut and banana allergy.'s were also negative.    Avoid current foods until blood tests are available.  Unable to identify a food allergy based on skin testing.  Depending on the blood test results may consider an oral allergy challenge in the office.  Zyrtec 10 mg as needed for mild allergy symptoms.  Epipen for more significant symptoms.    Follow-up to be depending on the blood test results.      Thank you for allowing me to participate in the care of Amina Roca.      I spent 35 minutes on the date of the encounter doing chart review, history and exam, documentation and further coordination as noted above exclusive of separately reported interpretations    Donald Castillo MD  Allergy/Immunology  [facility]

## 2024-03-11 NOTE — LETTER
3/11/2024         RE: Amina Roca  [address] [facility] [address]        Dear Colleague,    Thank you for referring your patient, Amina Roca, to the [facility]. Please see a copy of my visit note below.    Amina Roca was seen in the Allergy Clinic at [facility].    Amina Roca is a 43 year old female being seen today at the request of Andrew Kirk MD/[facility] in consultation for Allergic reaction to peanut as well as symptoms associated with eating banana, egg, tree nuts and shellfish.  She has not had known symptoms with tree nuts but avoids since the reaction to the peanut.    In October she was eating peanut butter and developed nausea, shortness of breath, erythema of her feet as well as palms as well as itching of the feet and palms.    At that point she did not require any treatment but did avoid peanut and tree nuts.  EpiPen was prescribed at that point.  Subsequently she was making peanut butter cookies without touching the dough with her hands and developed symptoms when they were baking in the oven with skin itching.    While eating a banana recently she developed a throat closing sensation without any other symptoms such as hives.  Claritin did provide some benefit.  That lasted 15 to 20 minutes.    Eggs now cause headaches.  2 years ago she ate shrimp and did develop dizziness and nausea with hives.      Past Medical History:   Diagnosis Date     Migraine      No family history on file.  Past Surgical History:   Procedure Laterality Date     HYSTERECTOMY         ENVIRONMENTAL HISTORY:   Pets inside the house include 1 dog(s).  Do you smoke cigarettes or other recreational drugs? No There is/are 0 smokers living in the house. The house does not have a damp basement.     SOCIAL HISTORY:   Amina is employed as . She lives with her spouse and 3 kids.      Review of  Systems      Current Outpatient Medications:      cloNIDine (CATAPRES) 0.2 MG tablet, Take 1 tablet (0.2 mg) by mouth 2 times daily, Disp: 180 tablet, Rfl: 1     EPINEPHrine (ANY BX GENERIC EQUIV) 0.3 MG/0.3ML injection 2-pack, Inject 0.3 mLs (0.3 mg) into the muscle as needed for anaphylaxis May repeat one time in 5-15 minutes if response to initial dose is inadequate., Disp: 2 each, Rfl: 0     Na Sulfate-K Sulfate-Mg Sulf (SUPREP BOWEL PREP KIT) solution, Split dose 2 day Regimen: The evening before you procedure: dilute one bottle with water to a total volume of 16 oz. (up to fill line).  At 5 pm, drink the entire amount.  Drink 32 ounces of water over the next hour. The morning of your procedure repeat both steps above using the second bottle.  Start five hours before you procedure and complete the prep at least three hours before you arrive., Disp: 354 mL, Rfl: 0  Allergies   Allergen Reactions     Nuts Shortness Of Breath, Itching, Swelling and Rash     peanuts     Penicillins Anaphylaxis     Shellfish Allergy Dermatitis, Diarrhea, Dizziness, Fatigue, Headache, Hives, Itching, Nausea, Palpitations, Shortness Of Breath and Visual Disturbance     Reglan [Metoclopramide] Other (See Comments)     Sweating         EXAM:   /77   Pulse 92   Wt 77.6 kg (171 lb)   SpO2 100%   BMI 28.46 kg/m      Physical Exam    Constitutional:       General: She is not in acute distress.     Appearance: Normal appearance. She is not ill-appearing.   HENT:      Head: Normocephalic and atraumatic.      Nose: Mild turbinate hypertrophy bilaterally     Mouth/Throat:      Mouth: Mucous membranes are moist.      Pharynx: Oropharynx is clear. No posterior oropharyngeal erythema.   Eyes:      General:         Right eye: No discharge.         Left eye: No discharge.   Cardiovascular:      Rate and Rhythm: Normal rate and regular rhythm.      Heart sounds: Normal heart sounds.   Pulmonary:      Effort: Pulmonary effort is normal.       Breath sounds: Normal breath sounds. No wheezing or rhonchi.   Skin:     General: Skin is warm.      Findings: No erythema or rash.   Neurological:      General: No focal deficit present.      Mental Status: She is alert. Mental status is at baseline.   Psychiatric:         Mood and Affect: Mood normal.         Behavior: Behavior normal.      WORKUP: Skin testing was negative to all foods tested.    ENVIRONMENTAL PERCUTANEOUS SKIN TESTING: ADULT      3/11/2024     9:00 AM   [facility]   Consent Y   Ordering Physician Dr. Castillo   Interpreting Physician Dr. Castillo   Testing Technician Elis   Location Back   Time start: 09:37   Time End: 09:52   Birch Mix (W/F in millimeters) 0   Ragweed Mix* ALK (W/F in millimeters) 0         FOOD ALLERGEN PERCUTANEOUS SKIN TESTING      3/11/2024     9:00 AM   [facility]    Consent Y   Ordering Physician Dr. castillo   Interpreting Physician Dr. Castillo   Testing Technician Elis   Location Back   Time start: 09:37   Time End: 09:52   Positive Control: Histatrol*ALK 1 mg/ml 5/20   Negative Control: 50% Glycerin**[place] Yris 0   Peanut 1:20 (W/F in millimeters) 0   [place]  1:20 (W/F in millimeters) 0   Cashew  1:20 (W/F in millimeters) 0   Pecan  1:20 (W/F in millimeters) 0   Pistachio*ALK (1:10 w/v) 0   [place] 1:20 (W/F in millimeters) 0   Hazelnut ([place])  1:20 (W/F in millimeters) 0   Brazil Nut  1:20 (W/F in millimeters) 0   Egg White 1:20 (W/F in millimeters) 0   Banana  1:40 (W/F in millimeters) 0   Shrimp 1:20 (W/F in millimeters) 0   Lobster 1:20 (W/F in millimeters) 0   Crab 1:20 (W/F in millimeters) 0   Clam 1:20 (W/F in millimeters) 0   Oyster 1:20 (W/F in millimeters) 0   Scallops 1:20 (W/F in millimeters) 0   Soy 1:40 w/v 0       ASSESSMENT/PLAN:  Amina Roca is a 43 year old female seen today for food allergy evaluation.  Skin testing was negative.  Will order blood testing to confirm these results.  Her symptoms were somewhat atypical for the banana  and egg reaction.  However the peanut butter reaction sounded significant as well as the shellfish.  Depending on the results may consider an oral challenge in the office.    Urgent ragweed were also added to evaluate for oral allergy syndrome which can be associate with peanut and banana allergy.'s were also negative.    Avoid current foods until blood tests are available.  Unable to identify a food allergy based on skin testing.  Depending on the blood test results may consider an oral allergy challenge in the office.  Zyrtec 10 mg as needed for mild allergy symptoms.  Epipen for more significant symptoms.    Follow-up to be depending on the blood test results.      Thank you for allowing me to participate in the care of Amina Roca.      I spent 35 minutes on the date of the encounter doing chart review, history and exam, documentation and further coordination as noted above exclusive of separately reported interpretations    Donald Castillo MD  Allergy/Immunology  [facility]      Per provider verbal order, placed Shellfish Panel and peanut panel scratch test.  Verbal consent was obtained by MD prior to procedure.  Once panels were placed, patient was monitored for 15 minutes in clinic.  Provider read test after 15 minutes.  Pt tolerated procedure well.  All questions and concerns were addressed at office visit.        SHELTON Gillis      Again, thank you for allowing me to participate in the care of your patient.        Sincerely,        Donald Castillo MD

## 2024-03-12 LAB
BANANA IGE QN: <0.1 KU(A)/L
CLAM IGE QN: <0.1 KU(A)/L
CRAB IGE QN: <0.1 KU(A)/L
LOBSTER IGE QN: <0.1 KU(A)/L
OYSTER IGE QN: <0.1 KU(A)/L
PEANUT (RARA H) 1 IGE QN: <0.1 KU(A)/L
PEANUT (RARA H) 2 IGE QN: <0.1 KU(A)/L
PEANUT (RARA H) 3 IGE QN: <0.1 KU(A)/L
PEANUT (RARA H) 6 IGE QN: <0.1 KU(A)/L
PEANUT (RARA H) 8 IGE QN: <0.1 KU(A)/L
PEANUT (RARA H) 9 IGE QN: <0.1 KU(A)/L
PEANUT IGE QN: <0.1 KU(A)/L
SCALLOP IGE QN: <0.1 KU(A)/L
SHRIMP IGE QN: <0.1 KU(A)/L
SOYBEAN IGE QN: <0.1 KU(A)/L

## 2024-03-15 ENCOUNTER — PATIENT OUTREACH (OUTPATIENT)
Dept: GASTROENTEROLOGY | Facility: CLINIC | Age: 44
End: 2024-03-15
Payer: OTHER GOVERNMENT

## 2024-03-15 NOTE — TELEPHONE ENCOUNTER
Reached out to pt to ascertain which medication(s) they are unable to stop for a HBT.  Imodium  (Not taking Citrucel)

## 2024-04-02 ENCOUNTER — OFFICE VISIT (OUTPATIENT)
Dept: FAMILY MEDICINE | Facility: CLINIC | Age: 44
End: 2024-04-02
Payer: OTHER GOVERNMENT

## 2024-04-02 VITALS
OXYGEN SATURATION: 100 % | DIASTOLIC BLOOD PRESSURE: 80 MMHG | SYSTOLIC BLOOD PRESSURE: 113 MMHG | HEIGHT: 65 IN | BODY MASS INDEX: 28.49 KG/M2 | HEART RATE: 89 BPM | WEIGHT: 171 LBS | RESPIRATION RATE: 16 BRPM | TEMPERATURE: 98.1 F

## 2024-04-02 DIAGNOSIS — G43.E09 CHRONIC MIGRAINE WITH AURA WITHOUT STATUS MIGRAINOSUS, NOT INTRACTABLE: Primary | ICD-10-CM

## 2024-04-02 DIAGNOSIS — R25.1 TREMOR: ICD-10-CM

## 2024-04-02 DIAGNOSIS — M62.838 SPASM OF MUSCLE: ICD-10-CM

## 2024-04-02 DIAGNOSIS — I10 BENIGN ESSENTIAL HYPERTENSION: ICD-10-CM

## 2024-04-02 PROCEDURE — 99214 OFFICE O/P EST MOD 30 MIN: CPT | Performed by: STUDENT IN AN ORGANIZED HEALTH CARE EDUCATION/TRAINING PROGRAM

## 2024-04-02 ASSESSMENT — PAIN SCALES - GENERAL: PAINLEVEL: NO PAIN (0)

## 2024-04-02 NOTE — PROGRESS NOTES
"  Assessment & Plan     Chronic migraine with aura without status migrainosus, not intractable  Completely controlled on clonidine IR 0.2mg BID. Does note side effect of fatigue. Discussed options including transitioning to different medication such as verapamil. Declines for now.     Benign essential hypertension  Well controlled on clonidine, as above.     Spasm of muscle  Over medial aspect of R upper leg. Very likely muscle spasm. Discussed conservative therapies and red flag symptoms/signs that should prompt RTC. No risk factors, signs of DVT. Think this is very unlikely.    Tremor  Describes internal tremor occurring 2-3x per week. Thinks may be associated with clonidine. Recent TSH on 10/2023 WNL. She worries about Parkinson's as her uncle was diagnosed in his 50's. She doesn't have classic symptoms/signs on exam. Unclear etiology. Continue to monitor for evolution of symptoms.    BMI  Estimated body mass index is 28.46 kg/m  as calculated from the following:    Height as of this encounter: 1.651 m (5' 5\").    Weight as of this encounter: 77.6 kg (171 lb).     Follow up in 3 months for annual physical    Andrew Kirk MD  [facility]  4/2/2024      Tyrell Huynh is a 44 year old, presenting for the following health issues:    Migraine Mgmt, Hypertension, and Leg Pain (Left inner mid thigh. Ongoing for about one week. No lump or mass pt can feel.)        4/2/2024     9:40 AM   Additional Questions   Roomed by brandon     History of Present Illness       Hypertension: She presents for follow up of hypertension.  She does not check blood pressure  regularly outside of the clinic. Outpatient blood pressures have not been over 140/90. She does not follow a low salt diet.     She eats 2-3 servings of fruits and vegetables daily.She consumes 1 sweetened beverage(s) daily.She exercises with enough effort to increase her heart rate 20 to 29 minutes per day.  She exercises with enough effort to " "increase her heart rate 3 or less days per week.   She is taking medications regularly.     Migraine   Since your last clinic visit, how have your headaches changed?  No change  How often are you getting headaches or migraines? Happens about once a week. Doesn't get pain, just facial numbness   Are you able to do normal daily activities when you have a migraine? Yes - does cause speech aphasia   Are you taking rescue/relief medications? (Select all that apply) Tylenol  How helpful is your rescue/relief medication?  The relief is inconsistent  Are you taking any medications to prevent migraines? (Select all that apply)  Other: Clonidine  In the past 4 weeks, how often have you gone to urgent care or the emergency room because of your headaches?  0    Leg pain  Ongoing for the past 1.5 weeks.  Noticed initially in the morning with localized pain in the medial aspect of L upper leg.   Is constant, unless sitting for prolonged period of time in which it is worse.  Sitting/standing/stretching/exercise doesn't help or change severity of pain.   No overlying erythema/skin changes, warmth, no lump/bump in region.    Pressing on the region worsens pain. Seems to feel \"heavier\" too.  Leans when stepping up or down ledge in the house.   First day was a lot more painful. Seems to be a little better since then.   Has tried tylenol/ibuprofen but doesn't seem to help.   Hasn't tried ice/heat.  No personal hx of blood clots. No recent trauma, surgery or immobility.   No lower leg swelling/redness or warmth either.     HTN  Not checking blood pressure unless feeling off.   Seems to be going well overall with clonidine IR  Every once in a while, rapid pulse but then resolves.   Not bothersome.     Migraines  Hasn't had any migraines since transitioning back to IR clonidine 0.2mg daily.  However has been much more fatigued after taking this medication.  Notices this approx 45 minutes after taking dose.   She tried splitting doses even " "more frequently but still tired afterwards.   The fatigue is more bearable than the ocular migraines.   Used to get ocular migraines up to 3-4x per day and this would inhibit her from driving and working.    Was getting internal tremors with the extended release. Like whole body is shaking.  This started when she started the ER, has gotten better with the IR dosing.     Feels the whole inside of the body shaking within the core.   Also within the brain, like shaking.    This occurs 2-3 times per week.  Started after transitioning to ER clonidine.  Has improved since returning to IR clonidine but not completely resolved.         Objective    /80   Pulse 89   Temp 98.1  F (36.7  C)   Resp 16   Ht 1.651 m (5' 5\")   Wt 77.6 kg (171 lb)   SpO2 100%   BMI 28.46 kg/m    Body mass index is 28.46 kg/m .    Physical Exam   GENERAL: healthy, alert and no distress  HEAD: Normocephalic, atraumatic.   EYES: Normal conjunctivae, sclera.   ENT: Normal oropharynx.   NECK: Supple. No lymphadenopathy appreciated.  RESP: lungs clear to auscultation - no rales, rhonchi or wheezes  CV: regular rate and rhythm, normal S1 S2, no murmur, click, rub or gallop.  No peripheral swelling noted.   ABDOMEN: soft, no TTP x4 quadrants. No hepatomegaly or masses appreciated. BS normactive.  MSK: Focal TTP over medial aspect of L upper leg. No overlying erythema, warmth, edema. Calves are symmetric in diameter.   SKIN: no suspicious lesions or rashes.  EXT: Warm and well perfused.  NEURO: CNII-XII grossly intact. No focal deficits. No cogwheel rigidity. No resting tremor. Normal gait.   PSYCH: Groomed, dressed appropriately for weather. Normal mood with consistent affect.     Signed Electronically by: Andrew Kirk MD    "

## 2024-04-06 ENCOUNTER — APPOINTMENT (OUTPATIENT)
Dept: CT IMAGING | Facility: CLINIC | Age: 44
End: 2024-04-06
Attending: EMERGENCY MEDICINE
Payer: OTHER GOVERNMENT

## 2024-04-06 ENCOUNTER — HOSPITAL ENCOUNTER (EMERGENCY)
Facility: CLINIC | Age: 44
Discharge: HOME | End: 2024-04-06
Attending: EMERGENCY MEDICINE | Admitting: EMERGENCY MEDICINE
Payer: OTHER GOVERNMENT

## 2024-04-06 VITALS
BODY MASS INDEX: 29.02 KG/M2 | HEIGHT: 65 IN | OXYGEN SATURATION: 91 % | WEIGHT: 174.16 LBS | RESPIRATION RATE: 18 BRPM | SYSTOLIC BLOOD PRESSURE: 159 MMHG | DIASTOLIC BLOOD PRESSURE: 104 MMHG | HEART RATE: 90 BPM | TEMPERATURE: 98.4 F

## 2024-04-06 DIAGNOSIS — N93.9 VAGINAL SPOTTING: ICD-10-CM

## 2024-04-06 DIAGNOSIS — R10.2 PELVIC PAIN IN FEMALE: ICD-10-CM

## 2024-04-06 LAB
ALBUMIN UR-MCNC: NEGATIVE MG/DL
ANION GAP SERPL CALCULATED.3IONS-SCNC: 10 MMOL/L (ref 7–15)
APPEARANCE UR: CLEAR
BACTERIA #/AREA URNS HPF: ABNORMAL /HPF
BASOPHILS # BLD AUTO: 0 10E3/UL (ref 0–0.2)
BASOPHILS NFR BLD AUTO: 0 %
BILIRUB UR QL STRIP: NEGATIVE
BUN SERPL-MCNC: 8.7 MG/DL (ref 6–20)
CALCIUM SERPL-MCNC: 9.4 MG/DL (ref 8.6–10)
CHLORIDE SERPL-SCNC: 103 MMOL/L (ref 98–107)
CLUE CELLS: NORMAL
COLOR UR AUTO: ABNORMAL
CREAT SERPL-MCNC: 0.77 MG/DL (ref 0.51–0.95)
DEPRECATED HCO3 PLAS-SCNC: 23 MMOL/L (ref 22–29)
EGFRCR SERPLBLD CKD-EPI 2021: >90 ML/MIN/1.73M2
EOSINOPHIL # BLD AUTO: 0 10E3/UL (ref 0–0.7)
EOSINOPHIL NFR BLD AUTO: 0 %
ERYTHROCYTE [DISTWIDTH] IN BLOOD BY AUTOMATED COUNT: 11.6 % (ref 10–15)
GLUCOSE SERPL-MCNC: 108 MG/DL (ref 70–99)
GLUCOSE UR STRIP-MCNC: NEGATIVE MG/DL
HCT VFR BLD AUTO: 43.5 % (ref 35–47)
HGB BLD-MCNC: 15 G/DL (ref 11.7–15.7)
HGB UR QL STRIP: NEGATIVE
HOLD SPECIMEN: NORMAL
HOLD SPECIMEN: NORMAL
IMM GRANULOCYTES # BLD: 0 10E3/UL
IMM GRANULOCYTES NFR BLD: 0 %
KETONES UR STRIP-MCNC: NEGATIVE MG/DL
LEUKOCYTE ESTERASE UR QL STRIP: NEGATIVE
LYMPHOCYTES # BLD AUTO: 1.8 10E3/UL (ref 0.8–5.3)
LYMPHOCYTES NFR BLD AUTO: 24 %
MCH RBC QN AUTO: 30 PG (ref 26.5–33)
MCHC RBC AUTO-ENTMCNC: 34.5 G/DL (ref 31.5–36.5)
MCV RBC AUTO: 87 FL (ref 78–100)
MONOCYTES # BLD AUTO: 0.6 10E3/UL (ref 0–1.3)
MONOCYTES NFR BLD AUTO: 8 %
NEUTROPHILS # BLD AUTO: 5 10E3/UL (ref 1.6–8.3)
NEUTROPHILS NFR BLD AUTO: 68 %
NITRATE UR QL: NEGATIVE
NRBC # BLD AUTO: 0 10E3/UL
NRBC BLD AUTO-RTO: 0 /100
PH UR STRIP: 7.5 [PH] (ref 5–7)
PLATELET # BLD AUTO: 296 10E3/UL (ref 150–450)
POTASSIUM SERPL-SCNC: 4.3 MMOL/L (ref 3.4–5.3)
RBC # BLD AUTO: 5 10E6/UL (ref 3.8–5.2)
RBC URINE: 1 /HPF
SODIUM SERPL-SCNC: 136 MMOL/L (ref 135–145)
SP GR UR STRIP: 1 (ref 1–1.03)
SQUAMOUS EPITHELIAL: 1 /HPF
TRICHOMONAS, WET PREP: NORMAL
UROBILINOGEN UR STRIP-MCNC: NORMAL MG/DL
WBC # BLD AUTO: 7.5 10E3/UL (ref 4–11)
WBC URINE: <1 /HPF
WBC'S/HIGH POWER FIELD, WET PREP: NORMAL
YEAST, WET PREP: NORMAL

## 2024-04-06 PROCEDURE — 80048 BASIC METABOLIC PNL TOTAL CA: CPT | Performed by: EMERGENCY MEDICINE

## 2024-04-06 PROCEDURE — 85025 COMPLETE CBC W/AUTO DIFF WBC: CPT | Performed by: EMERGENCY MEDICINE

## 2024-04-06 PROCEDURE — 87210 SMEAR WET MOUNT SALINE/INK: CPT | Performed by: EMERGENCY MEDICINE

## 2024-04-06 PROCEDURE — 96360 HYDRATION IV INFUSION INIT: CPT | Mod: 59

## 2024-04-06 PROCEDURE — 96361 HYDRATE IV INFUSION ADD-ON: CPT

## 2024-04-06 PROCEDURE — 81001 URINALYSIS AUTO W/SCOPE: CPT | Performed by: EMERGENCY MEDICINE

## 2024-04-06 PROCEDURE — 250N000009 HC RX 250: Performed by: EMERGENCY MEDICINE

## 2024-04-06 PROCEDURE — 258N000003 HC RX IP 258 OP 636: Performed by: EMERGENCY MEDICINE

## 2024-04-06 PROCEDURE — 250N000011 HC RX IP 250 OP 636: Performed by: EMERGENCY MEDICINE

## 2024-04-06 PROCEDURE — 99285 EMERGENCY DEPT VISIT HI MDM: CPT | Mod: 25

## 2024-04-06 PROCEDURE — 74177 CT ABD & PELVIS W/CONTRAST: CPT

## 2024-04-06 PROCEDURE — 36415 COLL VENOUS BLD VENIPUNCTURE: CPT | Performed by: EMERGENCY MEDICINE

## 2024-04-06 RX ORDER — IOPAMIDOL 755 MG/ML
500 INJECTION, SOLUTION INTRAVASCULAR ONCE
Status: COMPLETED | OUTPATIENT
Start: 2024-04-06 | End: 2024-04-06

## 2024-04-06 RX ADMIN — IOPAMIDOL 88 ML: 755 INJECTION, SOLUTION INTRAVENOUS at 20:26

## 2024-04-06 RX ADMIN — SODIUM CHLORIDE 66 ML: 9 INJECTION, SOLUTION INTRAVENOUS at 20:26

## 2024-04-06 RX ADMIN — SODIUM CHLORIDE 1000 ML: 9 INJECTION, SOLUTION INTRAVENOUS at 20:06

## 2024-04-06 ASSESSMENT — ACTIVITIES OF DAILY LIVING (ADL)
ADLS_ACUITY_SCORE: 35

## 2024-04-06 ASSESSMENT — COLUMBIA-SUICIDE SEVERITY RATING SCALE - C-SSRS
1. IN THE PAST MONTH, HAVE YOU WISHED YOU WERE DEAD OR WISHED YOU COULD GO TO SLEEP AND NOT WAKE UP?: NO
2. HAVE YOU ACTUALLY HAD ANY THOUGHTS OF KILLING YOURSELF IN THE PAST MONTH?: NO
6. HAVE YOU EVER DONE ANYTHING, STARTED TO DO ANYTHING, OR PREPARED TO DO ANYTHING TO END YOUR LIFE?: NO

## 2024-04-06 NOTE — ED TRIAGE NOTES
Pt presents to the ED with complaint of vaginal spotting starting earlier today. Pt has hx of hysterectomy. Pt is also experiencing lower abdominal cramping 3/10 and nausea.

## 2024-04-07 NOTE — ED PROVIDER NOTES
"  History     Chief Complaint:  Vaginal Bleeding       HPI   Amina Roca is a 44 year old female who presents with vaginal bleeding. Patient went to the bathroom to urinate and noticed a small amount of blood in her urine. She wiped and noticed some vaginal spotting which is unusual as she had a partial hysterectomy 2 years ago due to fibroids. She also notes some back pain and pelvic cramping with intermittent radiation to her LLQ consistent with period cramps. She endorses nausea. Denies fever, bowel issues, or urinary pain. She has an appointment with a gastroenterologist due to chronic diarrhea.  Patient recently moved here from [place] and is getting a referral to follow-up with a local gynecologist in this area.    Review of External Notes:   Gastroenterology note reviewed from January 5, 2024 and the patient was seen for left lower quadrant pain.      Medications:    Clonidine    Past Medical History:    Uterine leiomyoma   Hypertensive disorder  Migraine     Past Surgical History:    Carpal tunnel surgery  Partial hysterectomy   Ulnar nerve decompression  [place] tooth extraction     Physical Exam   Patient Vitals for the past 24 hrs:   BP Temp Temp src Pulse Resp SpO2 Height Weight   04/06/24 2225 (!) 159/104 -- -- 90 -- -- -- --   04/06/24 2224 -- -- -- -- 18 -- -- --   04/06/24 1849 (!) 148/86 98.4  F (36.9  C) Temporal 91 20 91 % 1.651 m (5' 5\") 79 kg (174 lb 2.6 oz)      Physical Exam  Constitutional:       General: She is not in acute distress.     Appearance: Normal appearance. She is not diaphoretic.   HENT:      Head: Atraumatic.      Mouth/Throat:      Mouth: Mucous membranes are moist.   Eyes:      General: No scleral icterus.     Conjunctiva/sclera: Conjunctivae normal.   Cardiovascular:      Rate and Rhythm: Normal rate.      Heart sounds: Normal heart sounds.   Pulmonary:      Effort: No respiratory distress.      Breath sounds: Normal breath sounds.   Abdominal:      General: Abdomen is flat. " There is no distension.      Comments: Mild suprapubic tenderness without guarding or rebound.   Genitourinary:     Comments: Pelvic exam in the presence of female nursing staff.  No external vaginal lesions.  The vaginal cuff appears normal.  No bleeding.  Vaginal Koza appears normal.  Musculoskeletal:      Cervical back: Neck supple.   Skin:     General: Skin is warm.      Capillary Refill: Capillary refill takes less than 2 seconds.      Findings: No rash.   Neurological:      General: No focal deficit present.      Mental Status: She is alert and oriented to person, place, and time.   Psychiatric:         Mood and Affect: Mood normal.         Behavior: Behavior normal.         Emergency Department Course   Imaging:  CT Abdomen Pelvis w Contrast   Final Result   IMPRESSION:    1.  No acute abnormality in the abdomen or pelvis. No evidence for enterovaginal fistula.   2.  Mild hepatic steatosis.   3.  Mild distal colonic diverticulosis.         Laboratory:  Labs Ordered and Resulted from Time of ED Arrival to Time of ED Departure   BASIC METABOLIC PANEL - Abnormal       Result Value    Sodium 136      Potassium 4.3      Chloride 103      Carbon Dioxide (CO2) 23      Anion Gap 10      Urea Nitrogen 8.7      Creatinine 0.77      GFR Estimate >90      Calcium 9.4      Glucose 108 (*)    ROUTINE UA WITH MICROSCOPIC REFLEX TO CULTURE - Abnormal    Color Urine Light Yellow      Appearance Urine Clear      Glucose Urine Negative      Bilirubin Urine Negative      Ketones Urine Negative      Specific Gravity Urine 1.004      Blood Urine Negative      pH Urine 7.5 (*)     Protein Albumin Urine Negative      Urobilinogen Urine Normal      Nitrite Urine Negative      Leukocyte Esterase Urine Negative      Bacteria Urine Few (*)     RBC Urine 1      WBC Urine <1      Squamous Epithelials Urine 1     WET PREPARATION - Normal    Trichomonas Absent      Yeast Absent      Clue Cells Absent      WBCs/high power field None     CBC  WITH PLATELETS AND DIFFERENTIAL    WBC Count 7.5      RBC Count 5.00      Hemoglobin 15.0      Hematocrit 43.5      MCV 87      MCH 30.0      MCHC 34.5      RDW 11.6      Platelet Count 296      % Neutrophils 68      % Lymphocytes 24      % Monocytes 8      % Eosinophils 0      % Basophils 0      % Immature Granulocytes 0      NRBCs per 100 WBC 0      Absolute Neutrophils 5.0      Absolute Lymphocytes 1.8      Absolute Monocytes 0.6      Absolute Eosinophils 0.0      Absolute Basophils 0.0      Absolute Immature Granulocytes 0.0      Absolute NRBCs 0.0        Emergency Department Course & Assessments:  Interventions:  Medications   sodium chloride 0.9% BOLUS 1,000 mL (0 mLs Intravenous Stopped 4/6/24 2138)   iopamidol (ISOVUE-370) solution 500 mL (88 mLs Intravenous $Given 4/6/24 2026)   for CT scan flush use (66 mLs Intravenous $Given 4/6/24 2026)      Disposition:  The patient was discharged.     Impression & Plan    Medical Decision Making:  This patient presents to the ED for evaluation of vaginal spotting.  She had a hysterectomy 2 years ago.  On exam I do not note any blood in the vaginal vault.  She denies bleeding in the urine and denies any rectal bleeding at all.  Laboratory work looks good.  CT scan without any evidence of an enterovaginal fistula.    At this point the patient would prefer to follow-up as an outpatient.  She is getting referral to see gynecology.  No evidence of urinary tract infection or kidney stone.  We discussed return precautions.      Diagnosis:    ICD-10-CM    1. Pelvic pain in female  R10.2       2. Vaginal spotting  N93.9             Scribe Disclosure:  I, Ari Keating, am serving as a scribe at 8:16 PM on 4/6/2024 to document services personally performed by Petr Alvarado MD based on my observations and the provider's statements to me.     4/6/2024   Petr Alvarado MD McRoberts, Sean Edward, MD  04/07/24 2990

## 2024-04-07 NOTE — DISCHARGE INSTRUCTIONS
Please follow-up with your clinic in the next 1 to 2 weeks.    Return to the ER for worsening bleeding or any new concerning changes.

## 2024-04-09 ENCOUNTER — TELEPHONE (OUTPATIENT)
Dept: GASTROENTEROLOGY | Facility: CLINIC | Age: 44
End: 2024-04-09

## 2024-04-09 DIAGNOSIS — Z91.018 ALLERGY TO SOY: Primary | ICD-10-CM

## 2024-04-09 NOTE — TELEPHONE ENCOUNTER
Pre visit planning completed.      Procedure details:    Patient scheduled for Colonoscopy/Upper endoscopy (EGD) on 4/24/24.     Arrival time: 1345. Procedure time 1430    Facility location: [facility]; [facility] [place] [address]. Check in location: 1st [facility].     Sedation type: Conscious sedation     Pre op exam needed? N/A    Indication for procedure: LLQ abdominal pain, Small intestinal bacterial overgrowth (SIBO), Epigastric pain, Loose stools      Chart review:     Electronic implanted devices? No    Recent diagnosis of diverticulitis within the last 6 weeks? No    Diabetic? No      Medication review:    Anticoagulants? No    NSAIDS? No NSAID medications per patient's medication list.  RN will verify with pre-assessment call.    Other medication HOLDING recommendations:  N/A      Prep for procedure:     Bowel prep recommendation: Suprep. Bowel prep prescription previously sent on 1/23/24 to [id number] [place] [facility] [address] [facility] AT [place] [address] & [facility]   Due to:  patient requested alternative to Miralax d/t allergy      Prep instructions sent via emy Valadez RN  Endoscopy Procedure Pre Assessment RN  [phone] option 4

## 2024-04-10 ENCOUNTER — TELEPHONE (OUTPATIENT)
Dept: GASTROENTEROLOGY | Facility: CLINIC | Age: 44
End: 2024-04-10
Payer: OTHER GOVERNMENT
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
## 2024-04-15 NOTE — TELEPHONE ENCOUNTER
Patient still scheduled for colon/EGD on 4/24/24.    Second call attempt to complete pre assessment.     Patient again stated they need to reschedule 4/24/24 procedure.     Warm transferred to [facility] in endoscopy scheduling to assist with rescheduling.       Elvi Valadez RN  Endoscopy Procedure Pre Assessment RN
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
## 2024-06-01 DIAGNOSIS — G43.E09 CHRONIC MIGRAINE WITH AURA WITHOUT STATUS MIGRAINOSUS, NOT INTRACTABLE: ICD-10-CM

## 2024-06-03 RX ORDER — CLONIDINE HYDROCHLORIDE 0.2 MG/1
0.2 TABLET ORAL 2 TIMES DAILY
Qty: 180 TABLET | Refills: 1 | OUTPATIENT
Start: 2024-06-03

## 2024-06-17 ENCOUNTER — MYC MEDICAL ADVICE (OUTPATIENT)
Dept: FAMILY MEDICINE | Facility: CLINIC | Age: 44
End: 2024-06-17
Payer: OTHER GOVERNMENT

## 2024-06-21 ENCOUNTER — VIRTUAL VISIT (OUTPATIENT)
Dept: GASTROENTEROLOGY | Facility: CLINIC | Age: 44
End: 2024-06-21
Attending: DIETITIAN, REGISTERED
Payer: OTHER GOVERNMENT

## 2024-06-21 VITALS — BODY MASS INDEX: 27.32 KG/M2 | HEIGHT: 65 IN | WEIGHT: 164 LBS

## 2024-06-21 DIAGNOSIS — K63.8219 SMALL INTESTINAL BACTERIAL OVERGROWTH (SIBO): ICD-10-CM

## 2024-06-21 DIAGNOSIS — R10.32 LLQ ABDOMINAL PAIN: ICD-10-CM

## 2024-06-21 DIAGNOSIS — R11.0 NAUSEA: ICD-10-CM

## 2024-06-21 DIAGNOSIS — R19.5 LOOSE STOOLS: Primary | ICD-10-CM

## 2024-06-21 DIAGNOSIS — R10.13 EPIGASTRIC PAIN: ICD-10-CM

## 2024-06-21 PROCEDURE — 99215 OFFICE O/P EST HI 40 MIN: CPT | Mod: 95 | Performed by: DIETITIAN, REGISTERED

## 2024-06-21 ASSESSMENT — PAIN SCALES - GENERAL: PAINLEVEL: MILD PAIN (2)

## 2024-06-21 NOTE — NURSING NOTE
Is the patient currently in the state of [place]? YES    Visit mode:VIDEO    If the visit is dropped, the patient can be reconnected by: VIDEO VISIT: Text to cell phone:   Telephone Information:   Mobile [phone]       Will anyone else be joining the visit? NO  (If patient encounters technical issues they should call [phone])    How would you like to obtain your AVS? MyChart    Are changes needed to the allergy or medication list? No    Are refills needed on medications prescribed by this physician? NO    Reason for visit: Video Visit (Recheck)    Gabriela FAN
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
## 2024-06-21 NOTE — PATIENT INSTRUCTIONS
It was a pleasure taking care of you today.  I've included a brief summary of our discussion and care plan from today's visit below.  Please review this information with your primary care provider.  ______________________________________________________________________    My recommendations are summarized as follows:  -- Complete upper endoscopy and colonoscopy as scheduled  -- Complete infectious stool studies and fecal calprotectin stool study  -- If infectious studies negative try increasing Imodium up to 1.5-2 tablets per day   -- If stool studies and colonoscopy and upper endoscopy without findings we can consider treating with empiric course of Rifaxamin and/or soluble fiber powder (soy free)  -- please see scheduling information provided below     Return to GI Clinic in 3 months to review your progress.    ______________________________________________________________________    How do I schedule labs, imaging studies, or procedures that were ordered in clinic today?     Labs: To schedule lab appointment at the [facility], use my chart or call [phone]. If you have a [facility] lab closer to home where you are regularly seen you can give them a call.     Procedures: If a colonoscopy, upper endoscopy, breath test, esophageal manometry, or pH impedence was ordered today, our endoscopy team will call you to schedule this. If you have not heard from our endoscopy team within a week, please call [phone] to schedule.     Imaging Studies: If you were scheduled for a CT scan, X-ray, MRI, ultrasound, HIDA scan or other imaging study, please call [phone] to have this scheduled.     Referral: If a referral to another specialty was ordered, expect a phone call or follow instructions above. If you have not heard from anyone regarding your referral in a week, please call our clinic to check the status.     Who do I call with any questions after my visit?  Please be in touch if there are any  further questions that arise following today's visit.  There are multiple ways to contact your gastroenterology care team.      During business hours, you may reach a Gastroenterology nurse at [phone]    To schedule or reschedule an appointment, please call [phone].     You can always send a secure message through [employer].  [employer] messages are answered by your nurse or doctor typically within 24 hours.  Please allow extra time on weekends and holidays.      For urgent/emergent questions after business hours, you may reach the on-call GI Fellow by contacting the [facility] at [phone].     How will I get the results of any tests ordered?    You will receive all of your results.  If you have signed up for [employer], any tests ordered at your visit will be available to you after your provider reviews them.  Typically this takes 1-2 weeks.  If there are urgent results that require a change in your care plan, your provider or nurse will call you to discuss the next steps.      What is [employer]?  [employer] is a secure way for you to access all of your healthcare records from the [facility].  It is a web based computer program, so you can sign on to it from any location.  It also allows you to send secure messages to your care team.  I recommend signing up for [employer] access if you have not already done so and are comfortable with using a computer.      How to I schedule a follow-up visit?  If you did not schedule a follow-up visit today, please call [phone] to schedule a follow-up office visit.      Sincerely,    Lidia Guerra PA-C  Division of Gastroenterology, Hepatology & Nutrition  [facility]
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
## 2024-07-08 ENCOUNTER — MYC MEDICAL ADVICE (OUTPATIENT)
Dept: FAMILY MEDICINE | Facility: CLINIC | Age: 44
End: 2024-07-08

## 2024-07-08 ENCOUNTER — TELEPHONE (OUTPATIENT)
Dept: CARDIOLOGY | Facility: CLINIC | Age: 44
End: 2024-07-08

## 2024-07-08 NOTE — TELEPHONE ENCOUNTER
2nd attempt- Left voicemail for the patient to call back and schedule the following:    Appointment type:  Return Cardiology  Provider:  Dr Gaytan  Return date:  Due in Jan 2024  Additional appointment(s) needed: none  Additonal Notes:  none  Specialty phone number: [phone]

## 2024-07-09 ENCOUNTER — OFFICE VISIT (OUTPATIENT)
Dept: CARDIOLOGY | Facility: CLINIC | Age: 44
End: 2024-07-09
Attending: INTERNAL MEDICINE
Payer: OTHER GOVERNMENT

## 2024-07-09 VITALS
SYSTOLIC BLOOD PRESSURE: 144 MMHG | WEIGHT: 172 LBS | DIASTOLIC BLOOD PRESSURE: 90 MMHG | HEIGHT: 65 IN | HEART RATE: 80 BPM | BODY MASS INDEX: 28.66 KG/M2

## 2024-07-09 DIAGNOSIS — R00.0 TACHYCARDIA: ICD-10-CM

## 2024-07-09 DIAGNOSIS — Q23.81 BICUSPID AORTIC VALVE: ICD-10-CM

## 2024-07-09 DIAGNOSIS — R19.5 LOOSE STOOLS: ICD-10-CM

## 2024-07-09 PROCEDURE — 99213 OFFICE O/P EST LOW 20 MIN: CPT | Performed by: INTERNAL MEDICINE

## 2024-07-09 RX ORDER — DOXYCYCLINE 100 MG/1
100 CAPSULE ORAL DAILY
COMMUNITY

## 2024-07-09 NOTE — PROGRESS NOTES
HPI and Plan:   Amina Roca is a 44 year old female who presents with history of bicuspid aortic valve, ascending aortic aneurysm and tachycardia.  She is actually following up on a Holter monitor she had done in December.  She has symptoms of palpitations that occur randomly and have persisted.  She did have symptoms while wearing the heart monitor although she did not have any significant arrhythmias.  She had very few ectopic beats about only 6 in the 24-hour period no sustained arrhythmias were noted.  Her average heart rate was 83 bpm, maximal 113 bpm minimal 68 bpm.  I did review these test results with her today  We talked about management and evaluation of her bicuspid valve as well is her symptoms of palpitations.  She is very reluctant to trying any new blood pressure medication as clonidine has not only been effective in controlling her blood pressure but also her ocular migraine headaches.    Summary    1.  Palpitations-Holter monitor was benign with very few ectopic beats and no arrhythmias and normal heart rate variability.  I do not feel any further management or treatment is necessary at this time.    2.  Bicuspid aortic valve with mild aortic stenosis noted on her last echo in October 2023-recommend follow-up echo in October 2025 or every 2 years while asymptomatic    I am happy to see her back at any time for any of her future cardiovascular needs, please feel free to contact me with any questions given regards to her care         Today's clinic visit entailed:  Review of the result(s) of each unique test - holter monitor    Provider  Link to [facility] Help Grid     The level of medical decision making during this visit was of moderate complexity.    No orders of the defined types were placed in this encounter.    Orders Placed This Encounter   Medications    Multiple Vitamin (MULTIVITAMIN ADULT PO)     Sig: Take by mouth daily    doxycycline monohydrate (MONODOX) 100 MG capsule     Sig: Take 100 mg  by mouth daily     There are no discontinued medications.      Encounter Diagnoses   Name Primary?    Bicuspid aortic valve     Tachycardia        CURRENT MEDICATIONS:  Current Outpatient Medications   Medication Sig Dispense Refill    cloNIDine (CATAPRES) 0.2 MG tablet Take 1 tablet (0.2 mg) by mouth 2 times daily 180 tablet 1    doxycycline monohydrate (MONODOX) 100 MG capsule Take 100 mg by mouth daily      EPINEPHrine (ANY BX GENERIC EQUIV) 0.3 MG/0.3ML injection 2-pack Inject 0.3 mLs (0.3 mg) into the muscle as needed for anaphylaxis May repeat one time in 5-15 minutes if response to initial dose is inadequate. 2 each 0    Multiple Vitamin (MULTIVITAMIN ADULT PO) Take by mouth daily         ALLERGIES     Allergies   Allergen Reactions    Losartan Difficulty breathing, Dizziness, Headache, Palpitations, Shortness Of Breath, Tinnitus and Visual Disturbance    Nuts Shortness Of Breath, Itching, Swelling and Rash     peanuts    Penicillins Anaphylaxis    Shellfish Allergy Dermatitis, Diarrhea, Dizziness, Fatigue, Headache, Hives, Itching, Nausea, Palpitations, Shortness Of Breath and Visual Disturbance    Spironolactone Anxiety, Cough, Diarrhea, Difficulty breathing, Dizziness, Palpitations, Photosensitivity, Shortness Of Breath and Visual Disturbance    Reglan [Metoclopramide] Other (See Comments)     Sweating       PAST MEDICAL HISTORY:  Past Medical History:   Diagnosis Date    Migraine        PAST SURGICAL HISTORY:  Past Surgical History:   Procedure Laterality Date    HYSTERECTOMY         FAMILY HISTORY:  No family history on file.    SOCIAL HISTORY:  Social History     Socioeconomic History    Marital status:      Spouse name: None    Number of children: None    Years of education: None    Highest education level: None   Tobacco Use    Smoking status: Never    Smokeless tobacco: Never   Vaping Use    Vaping status: Never Used   Substance and Sexual Activity    Alcohol use: Not Currently     Social  "Determinants of Health     Financial Resource Strain: Low Risk  (2/2/2024)    Financial Resource Strain     Within the past 12 months, have you or your family members you live with been unable to get utilities (heat, electricity) when it was really needed?: No   Food Insecurity: Low Risk  (2/2/2024)    Food Insecurity     Within the past 12 months, did you worry that your food would run out before you got money to buy more?: No     Within the past 12 months, did the food you bought just not last and you didn t have money to get more?: No   Transportation Needs: Low Risk  (2/2/2024)    Transportation Needs     Within the past 12 months, has lack of transportation kept you from medical appointments, getting your medicines, non-medical meetings or appointments, work, or from getting things that you need?: No   Interpersonal Safety: Low Risk  (4/2/2024)    Interpersonal Safety     Do you feel physically and emotionally safe where you currently live?: Yes     Within the past 12 months, have you been hit, slapped, kicked or otherwise physically hurt by someone?: No     Within the past 12 months, have you been humiliated or emotionally abused in other ways by your partner or ex-partner?: No   Housing Stability: Low Risk  (2/2/2024)    Housing Stability     Do you have housing? : Yes     Are you worried about losing your housing?: No       Review of Systems:  Skin:  not assessed     Eyes:  not assessed    ENT:  not assessed    Respiratory:  Negative    Cardiovascular:    Positive for;palpitations;fatigue  Gastroenterology: not assessed    Genitourinary:  not assessed    Musculoskeletal:  not assessed    Neurologic:  not assessed    Psychiatric:  not assessed    Heme/Lymph/Imm:  not assessed    Endocrine:  not assessed      Physical Exam:  Vitals: BP (!) 144/90 (BP Location: Right arm, Patient Position: Sitting, Cuff Size: Adult Regular)   Pulse 80   Ht 1.651 m (5' 5\")   Wt 78 kg (172 lb)   BMI 28.62 kg/m  " "    Constitutional:  cooperative;in no acute distress        Skin:  warm and dry to the touch          Head:  normocephalic        Eyes:  pupils equal and round        Lymph:      ENT:  no pallor or cyanosis        Neck:  no carotid bruit        Respiratory:  clear to auscultation;normal symmetry         Cardiac: regular rhythm         systolic ejection murmur;grade 2      pulses full and equal                                        GI:  abdomen soft        Extremities and Muscular Skeletal:  no deformities, clubbing, cyanosis, erythema observed;no edema              Neurological:  no gross motor deficits;affect appropriate        Psych:  Alert and Oriented x 3        Recent Lab Results:  LIPID RESULTS:  No results found for: \"CHOL\", \"HDL\", \"LDL\", \"TRIG\", \"CHOLHDLRATIO\"    LIVER ENZYME RESULTS:  Lab Results   Component Value Date    AST 30 11/16/2023    ALT 31 11/16/2023       CBC RESULTS:  Lab Results   Component Value Date    WBC 7.5 04/06/2024    RBC 5.00 04/06/2024    HGB 15.0 04/06/2024    HCT 43.5 04/06/2024    MCV 87 04/06/2024    MCH 30.0 04/06/2024    MCHC 34.5 04/06/2024    RDW 11.6 04/06/2024     04/06/2024       BMP RESULTS:  Lab Results   Component Value Date     04/06/2024    POTASSIUM 4.3 04/06/2024    CHLORIDE 103 04/06/2024    CO2 23 04/06/2024    ANIONGAP 10 04/06/2024     (H) 04/06/2024    BUN 8.7 04/06/2024    CR 0.77 04/06/2024    GFRESTIMATED >90 04/06/2024    ANDRE 9.4 04/06/2024        A1C RESULTS:  No results found for: \"A1C\"    INR RESULTS:  No results found for: \"INR\"        CC  Ashley Gaytan,   [address]  [place] [id number]                "

## 2024-07-09 NOTE — LETTER
7/9/2024    Andrew iKrk MD  [id number]  [facility] [id number]    RE: Amina Roca       Dear Colleague,     I had the pleasure of seeing Amina Roca in the [facility].  HPI and Plan:   Amina Roca is a 44 year old female who presents with history of bicuspid aortic valve, ascending aortic aneurysm and tachycardia.  She is actually following up on a Holter monitor she had done in December.  She has symptoms of palpitations that occur randomly and have persisted.  She did have symptoms while wearing the heart monitor although she did not have any significant arrhythmias.  She had very few ectopic beats about only 6 in the 24-hour period no sustained arrhythmias were noted.  Her average heart rate was 83 bpm, maximal 113 bpm minimal 68 bpm.  I did review these test results with her today  We talked about management and evaluation of her bicuspid valve as well is her symptoms of palpitations.  She is very reluctant to trying any new blood pressure medication as clonidine has not only been effective in controlling her blood pressure but also her ocular migraine headaches.    Summary    1.  Palpitations-Holter monitor was benign with very few ectopic beats and no arrhythmias and normal heart rate variability.  I do not feel any further management or treatment is necessary at this time.    2.  Bicuspid aortic valve with mild aortic stenosis noted on her last echo in October 2023-recommend follow-up echo in October 2025 or every 2 years while asymptomatic    I am happy to see her back at any time for any of her future cardiovascular needs, please feel free to contact me with any questions given regards to her care         Today's clinic visit entailed:  Review of the result(s) of each unique test - holter monitor    Provider  Link to [facility] Help Grid     The level of medical decision making during this visit was of moderate complexity.    No orders of the defined types were placed in this  encounter.    Orders Placed This Encounter   Medications    Multiple Vitamin (MULTIVITAMIN ADULT PO)     Sig: Take by mouth daily    doxycycline monohydrate (MONODOX) 100 MG capsule     Sig: Take 100 mg by mouth daily     There are no discontinued medications.      Encounter Diagnoses   Name Primary?    Bicuspid aortic valve     Tachycardia        CURRENT MEDICATIONS:  Current Outpatient Medications   Medication Sig Dispense Refill    cloNIDine (CATAPRES) 0.2 MG tablet Take 1 tablet (0.2 mg) by mouth 2 times daily 180 tablet 1    doxycycline monohydrate (MONODOX) 100 MG capsule Take 100 mg by mouth daily      EPINEPHrine (ANY BX GENERIC EQUIV) 0.3 MG/0.3ML injection 2-pack Inject 0.3 mLs (0.3 mg) into the muscle as needed for anaphylaxis May repeat one time in 5-15 minutes if response to initial dose is inadequate. 2 each 0    Multiple Vitamin (MULTIVITAMIN ADULT PO) Take by mouth daily         ALLERGIES     Allergies   Allergen Reactions    Losartan Difficulty breathing, Dizziness, Headache, Palpitations, Shortness Of Breath, Tinnitus and Visual Disturbance    Nuts Shortness Of Breath, Itching, Swelling and Rash     peanuts    Penicillins Anaphylaxis    Shellfish Allergy Dermatitis, Diarrhea, Dizziness, Fatigue, Headache, Hives, Itching, Nausea, Palpitations, Shortness Of Breath and Visual Disturbance    Spironolactone Anxiety, Cough, Diarrhea, Difficulty breathing, Dizziness, Palpitations, Photosensitivity, Shortness Of Breath and Visual Disturbance    Reglan [Metoclopramide] Other (See Comments)     Sweating       PAST MEDICAL HISTORY:  Past Medical History:   Diagnosis Date    Migraine        PAST SURGICAL HISTORY:  Past Surgical History:   Procedure Laterality Date    HYSTERECTOMY         FAMILY HISTORY:  No family history on file.    SOCIAL HISTORY:  Social History     Socioeconomic History    Marital status:      Spouse name: None    Number of children: None    Years of education: None    Highest  education level: None   Tobacco Use    Smoking status: Never    Smokeless tobacco: Never   Vaping Use    Vaping status: Never Used   Substance and Sexual Activity    Alcohol use: Not Currently     Social Determinants of Health     Financial Resource Strain: Low Risk  (2/2/2024)    Financial Resource Strain     Within the past 12 months, have you or your family members you live with been unable to get utilities (heat, electricity) when it was really needed?: No   Food Insecurity: Low Risk  (2/2/2024)    Food Insecurity     Within the past 12 months, did you worry that your food would run out before you got money to buy more?: No     Within the past 12 months, did the food you bought just not last and you didn t have money to get more?: No   Transportation Needs: Low Risk  (2/2/2024)    Transportation Needs     Within the past 12 months, has lack of transportation kept you from medical appointments, getting your medicines, non-medical meetings or appointments, work, or from getting things that you need?: No   Interpersonal Safety: Low Risk  (4/2/2024)    Interpersonal Safety     Do you feel physically and emotionally safe where you currently live?: Yes     Within the past 12 months, have you been hit, slapped, kicked or otherwise physically hurt by someone?: No     Within the past 12 months, have you been humiliated or emotionally abused in other ways by your partner or ex-partner?: No   Housing Stability: Low Risk  (2/2/2024)    Housing Stability     Do you have housing? : Yes     Are you worried about losing your housing?: No       Review of Systems:  Skin:  not assessed     Eyes:  not assessed    ENT:  not assessed    Respiratory:  Negative    Cardiovascular:    Positive for;palpitations;fatigue  Gastroenterology: not assessed    Genitourinary:  not assessed    Musculoskeletal:  not assessed    Neurologic:  not assessed    Psychiatric:  not assessed    Heme/Lymph/Imm:  not assessed    Endocrine:  not assessed   "    Physical Exam:  Vitals: BP (!) 144/90 (BP Location: Right arm, Patient Position: Sitting, Cuff Size: Adult Regular)   Pulse 80   Ht 1.651 m (5' 5\")   Wt 78 kg (172 lb)   BMI 28.62 kg/m      Constitutional:  cooperative;in no acute distress        Skin:  warm and dry to the touch          Head:  normocephalic        Eyes:  pupils equal and round        Lymph:      ENT:  no pallor or cyanosis        Neck:  no carotid bruit        Respiratory:  clear to auscultation;normal symmetry         Cardiac: regular rhythm         systolic ejection murmur;grade 2      pulses full and equal                                        GI:  abdomen soft        Extremities and Muscular Skeletal:  no deformities, clubbing, cyanosis, erythema observed;no edema              Neurological:  no gross motor deficits;affect appropriate        Psych:  Alert and Oriented x 3        Recent Lab Results:  LIPID RESULTS:  No results found for: \"CHOL\", \"HDL\", \"LDL\", \"TRIG\", \"CHOLHDLRATIO\"    LIVER ENZYME RESULTS:  Lab Results   Component Value Date    AST 30 11/16/2023    ALT 31 11/16/2023       CBC RESULTS:  Lab Results   Component Value Date    WBC 7.5 04/06/2024    RBC 5.00 04/06/2024    HGB 15.0 04/06/2024    HCT 43.5 04/06/2024    MCV 87 04/06/2024    MCH 30.0 04/06/2024    MCHC 34.5 04/06/2024    RDW 11.6 04/06/2024     04/06/2024       BMP RESULTS:  Lab Results   Component Value Date     04/06/2024    POTASSIUM 4.3 04/06/2024    CHLORIDE 103 04/06/2024    CO2 23 04/06/2024    ANIONGAP 10 04/06/2024     (H) 04/06/2024    BUN 8.7 04/06/2024    CR 0.77 04/06/2024    GFRESTIMATED >90 04/06/2024    ANDRE 9.4 04/06/2024        A1C RESULTS:  No results found for: \"A1C\"    INR RESULTS:  No results found for: \"INR\"        CC  Ashley Gaytan,   [address]  [place] [id number]      Thank you for allowing me to participate in the care of your patient.      Sincerely,     DO DEBI De Santiago " [facility]

## 2024-07-11 PROBLEM — Q23.81 BICUSPID AORTIC VALVE WITH ASCENDING AORTA 4.0 TO 4.5 CM IN DIAMETER: Status: ACTIVE | Noted: 2023-10-31

## 2024-07-18 RX ORDER — SODIUM, POTASSIUM,MAG SULFATES 17.5-3.13G
SOLUTION, RECONSTITUTED, ORAL ORAL
Qty: 354 ML | Refills: 0 | Status: SHIPPED | OUTPATIENT
Start: 2024-07-18 | End: 2024-09-27

## 2024-07-18 NOTE — TELEPHONE ENCOUNTER
Rescheduled Procedure    Pre visit planning completed.      Procedure details:    Patient scheduled for Colonoscopy/Upper endoscopy (EGD) on 7/30/24.     Arrival time: 0830. Procedure time 0915    Facility location: [facility]; [facility] [address]. Check in location: [facility].     Sedation type: Conscious sedation     Pre op exam needed? No.    Indication for procedure: LLQ abdominal pain, Small intestinal bacterial overgrowth (SIBO), Epigastric pain, Loose stools      Chart review:     Electronic implanted devices? No    Recent diagnosis of diverticulitis within the last 6 weeks? No    Diabetic? No      Medication review:    Anticoagulants? No    NSAIDS? No NSAID medications per patient's medication list.  RN will verify with pre-assessment call.    Other medication HOLDING recommendations:  N/A      Prep for procedure:     Bowel prep recommendation: Suprep. Bowel prep prescription resent to [id number] - [place], [place] - [address] [place] [address] [address] [facility] (previous script was cancelled - unsure if patient picked up or not)  Due to:  patient requested alternative to Miralax d/t soy allergy (see TE 1/16/24 for details)  Of note, Miralax does not contain soy however Suprep was sent again as that was previously sent to patient.    Prep instructions resent via emy Valadez RN  Endoscopy Procedure Pre Assessment RN  [phone] option 4

## 2024-07-19 NOTE — TELEPHONE ENCOUNTER
Pre assessment completed for upcoming procedure.   (Please see previous telephone encounter notes for complete details)      Procedure details:    Arrival time and facility location reviewed.    Pre op exam needed? No.    Designated  policy reviewed. Instructed to have someone stay 6 hours post procedure.       Medication review:    Medications reviewed. Please see supporting documentation below. Holding recommendations discussed (if applicable).   NSAID medication(s): Ibuprofen (Advil, Motrin): HOLD 1 day before procedure.      Prep for procedure:     Patient reports last time they had Miralax they had trouble breathing - added to allergy list. Patients asks if Suprep contains any nut or soy ingredients. Advised patient that Suprep should not contain any nut or soy ingredients but patient should discuss with pharmacist when picking up script to confirm. Patient verbalized understanding and stated they will check with pharmacist at .    Patient reports they are going to  the Suprep soon, as the pharmacy had notified them it was ready.    Patient stated they have already reviewed the bowel prep instructions and writer answered all patient questions (if applicable). NPO instructions reviewed.    Patient was instructed to call if any questions or concerns arise.       Any additional information needed:  N/A      Patient verbalized understanding and had no questions or concerns at this time.      Elvi Valadez RN  Endoscopy Procedure Pre Assessment   [phone] option 4

## 2024-07-22 LAB
ADV 40+41 DNA STL QL NAA+NON-PROBE: NEGATIVE
ASTRO TYP 1-8 RNA STL QL NAA+NON-PROBE: NEGATIVE
C CAYETANENSIS DNA STL QL NAA+NON-PROBE: NEGATIVE
CAMPYLOBACTER DNA SPEC NAA+PROBE: NEGATIVE
CRYPTOSP DNA STL QL NAA+NON-PROBE: NEGATIVE
E COLI O157 DNA STL QL NAA+NON-PROBE: NORMAL
E HISTOLYT DNA STL QL NAA+NON-PROBE: NEGATIVE
EAEC ASTA GENE ISLT QL NAA+PROBE: NEGATIVE
EC STX1+STX2 GENES STL QL NAA+NON-PROBE: NEGATIVE
EPEC EAE GENE STL QL NAA+NON-PROBE: NEGATIVE
ETEC LTA+ST1A+ST1B TOX ST NAA+NON-PROBE: NEGATIVE
G LAMBLIA DNA STL QL NAA+NON-PROBE: NEGATIVE
NOROVIRUS GI+II RNA STL QL NAA+NON-PROBE: NEGATIVE
P SHIGELLOIDES DNA STL QL NAA+NON-PROBE: NEGATIVE
RVA RNA STL QL NAA+NON-PROBE: NEGATIVE
SALMONELLA SP RPOD STL QL NAA+PROBE: NEGATIVE
SAPO I+II+IV+V RNA STL QL NAA+NON-PROBE: NEGATIVE
SHIGELLA SP+EIEC IPAH ST NAA+NON-PROBE: NEGATIVE
V CHOLERAE DNA SPEC QL NAA+PROBE: NEGATIVE
VIBRIO DNA SPEC NAA+PROBE: NEGATIVE
Y ENTEROCOL DNA STL QL NAA+PROBE: NEGATIVE

## 2024-07-22 PROCEDURE — 83993 ASSAY FOR CALPROTECTIN FECAL: CPT | Performed by: INTERNAL MEDICINE

## 2024-07-22 PROCEDURE — 87507 IADNA-DNA/RNA PROBE TQ 12-25: CPT | Performed by: INTERNAL MEDICINE

## 2024-07-24 LAB — CALPROTECTIN STL-MCNT: 10.4 MG/KG (ref 0–49.9)

## 2024-07-30 ENCOUNTER — HOSPITAL ENCOUNTER (OUTPATIENT)
Facility: CLINIC | Age: 44
Discharge: HOME | End: 2024-07-30
Attending: INTERNAL MEDICINE | Admitting: INTERNAL MEDICINE
Payer: OTHER GOVERNMENT

## 2024-07-30 VITALS
HEIGHT: 65 IN | WEIGHT: 165 LBS | DIASTOLIC BLOOD PRESSURE: 90 MMHG | SYSTOLIC BLOOD PRESSURE: 155 MMHG | RESPIRATION RATE: 10 BRPM | HEART RATE: 119 BPM | OXYGEN SATURATION: 100 % | BODY MASS INDEX: 27.49 KG/M2

## 2024-07-30 LAB
COLONOSCOPY: NORMAL
UPPER GI ENDOSCOPY: NORMAL

## 2024-07-30 PROCEDURE — 43239 EGD BIOPSY SINGLE/MULTIPLE: CPT | Performed by: INTERNAL MEDICINE

## 2024-07-30 PROCEDURE — 250N000009 HC RX 250: Performed by: INTERNAL MEDICINE

## 2024-07-30 PROCEDURE — G0500 MOD SEDAT ENDO SERVICE >5YRS: HCPCS | Performed by: INTERNAL MEDICINE

## 2024-07-30 PROCEDURE — 88305 TISSUE EXAM BY PATHOLOGIST: CPT | Mod: TC | Performed by: INTERNAL MEDICINE

## 2024-07-30 PROCEDURE — 45380 COLONOSCOPY AND BIOPSY: CPT | Performed by: INTERNAL MEDICINE

## 2024-07-30 PROCEDURE — 99153 MOD SED SAME PHYS/QHP EA: CPT | Performed by: INTERNAL MEDICINE

## 2024-07-30 PROCEDURE — 88305 TISSUE EXAM BY PATHOLOGIST: CPT | Mod: 26 | Performed by: PATHOLOGY

## 2024-07-30 PROCEDURE — 250N000011 HC RX IP 250 OP 636: Performed by: INTERNAL MEDICINE

## 2024-07-30 RX ORDER — DIPHENHYDRAMINE HYDROCHLORIDE 50 MG/ML
INJECTION INTRAMUSCULAR; INTRAVENOUS PRN
Status: DISCONTINUED | OUTPATIENT
Start: 2024-07-30 | End: 2024-07-30 | Stop reason: HOSPADM

## 2024-07-30 RX ORDER — FENTANYL CITRATE 50 UG/ML
INJECTION, SOLUTION INTRAMUSCULAR; INTRAVENOUS PRN
Status: DISCONTINUED | OUTPATIENT
Start: 2024-07-30 | End: 2024-07-30 | Stop reason: HOSPADM

## 2024-07-30 ASSESSMENT — ACTIVITIES OF DAILY LIVING (ADL)
ADLS_ACUITY_SCORE: 35

## 2024-07-30 NOTE — H&P
ENDOSCOPY PRE-SEDATION H&P FOR OUTPATIENT PROCEDURES    Amina Roca  [id number]  1980    Procedure: Colonoscopy Endoscopy    Pre-procedure diagnosis: Diarrhea    Past medical history:   Past Medical History:   Diagnosis Date    Congenital bicuspid aortic valve     Migraine      Patient Active Problem List   Diagnosis    H/O vaginal hysterectomy    Migraine    Hypertensive disorder    Bicuspid aortic valve with ascending aorta 4.0 to 4.5 cm in diameter       Past surgical history:   Past Surgical History:   Procedure Laterality Date    CARPAL TUNNEL RELEASE RT/LT Right     HYSTERECTOMY         No current facility-administered medications for this encounter.       Allergies   Allergen Reactions    Losartan Difficulty breathing, Dizziness, Headache, Palpitations, Shortness Of Breath, Tinnitus and Visual Disturbance    Nuts Shortness Of Breath, Itching, Swelling and Rash     peanuts    Penicillins Anaphylaxis    Shellfish Allergy Dermatitis, Diarrhea, Dizziness, Fatigue, Headache, Hives, Itching, Nausea, Palpitations, Shortness Of Breath and Visual Disturbance    Spironolactone Anxiety, Cough, Diarrhea, Difficulty breathing, Dizziness, Palpitations, Photosensitivity, Shortness Of Breath and Visual Disturbance    Miralax [Polyethylene Glycol] Difficulty breathing     Per patient report.    Reglan [Metoclopramide] Other (See Comments)     Sweating    Soy Allergy      Pt reported.       History of Anesthesia/Sedation Problems: no    Physical Exam:    Mental status: alert  Heart: Normal except mild systolic murmur  Lung: Normal  Assessment of patient's airway: Normal  Other as pertinent for procedure: None     Lab Results   Component Value Date    WBC 7.5 04/06/2024     Lab Results   Component Value Date    RBC 5.00 04/06/2024     Lab Results   Component Value Date    HGB 15.0 04/06/2024     Lab Results   Component Value Date    HCT 43.5 04/06/2024     Lab Results   Component Value Date    MCV 87 04/06/2024     Lab  "Results   Component Value Date    MCH 30.0 04/06/2024     Lab Results   Component Value Date    MCHC 34.5 04/06/2024     Lab Results   Component Value Date    RDW 11.6 04/06/2024     Lab Results   Component Value Date     04/06/2024     No results found for: \"INR\"     ASA Score: See Provation note    Mallampati score:  II - Faucial pillars and soft palate may be seen, but uvula is masked by the base of the tongue    Assessment/Plan:     The patient is an appropriate candidate to receive sedation.    Informed consent was discussed with the patient/family, including the risks, benefits, potential complications and any alternative options associated with sedation.    Patient assessment completed just prior to sedation and while under constant observation by the provider. Condition determined to be adequate for proceeding with sedation.    The specific risks for the procedure were discussed with the patient at the time of informed consent and include but are not limited to perforation which could require surgery, missing significant neoplasm or lesion, hemorrhage and adverse sedative complication.      Joseph Ibarra MD                 "

## 2024-07-31 LAB
PATH REPORT.COMMENTS IMP SPEC: NORMAL
PATH REPORT.COMMENTS IMP SPEC: NORMAL
PATH REPORT.FINAL DX SPEC: NORMAL
PATH REPORT.GROSS SPEC: NORMAL
PATH REPORT.MICROSCOPIC SPEC OTHER STN: NORMAL
PATH REPORT.RELEVANT HX SPEC: NORMAL
PHOTO IMAGE: NORMAL

## 2024-08-01 NOTE — RESULT ENCOUNTER NOTE
Ms. Roca:    At the time of your endoscopy, we took biopsies of your stomach and duodenum. These did not show any abnormalities.    We also took random biopsies in the colon, which were normal.    If you have any questions, please contact the Gastroenterology nurse at [phone].     - Dr. Ibarra

## 2024-08-10 DIAGNOSIS — G43.E09 CHRONIC MIGRAINE WITH AURA WITHOUT STATUS MIGRAINOSUS, NOT INTRACTABLE: ICD-10-CM

## 2024-08-12 RX ORDER — CLONIDINE HYDROCHLORIDE 0.2 MG/1
0.2 TABLET ORAL 2 TIMES DAILY
Qty: 180 TABLET | Refills: 1 | Status: SHIPPED | OUTPATIENT
Start: 2024-08-12

## 2024-09-27 ENCOUNTER — ANCILLARY PROCEDURE (OUTPATIENT)
Dept: GENERAL RADIOLOGY | Facility: CLINIC | Age: 44
End: 2024-09-27
Attending: GENERAL PRACTICE
Payer: OTHER GOVERNMENT

## 2024-09-27 ENCOUNTER — OFFICE VISIT (OUTPATIENT)
Dept: FAMILY MEDICINE | Facility: CLINIC | Age: 44
End: 2024-09-27
Payer: OTHER GOVERNMENT

## 2024-09-27 VITALS
TEMPERATURE: 97.8 F | OXYGEN SATURATION: 100 % | WEIGHT: 172.6 LBS | SYSTOLIC BLOOD PRESSURE: 135 MMHG | HEIGHT: 66 IN | RESPIRATION RATE: 16 BRPM | BODY MASS INDEX: 27.74 KG/M2 | HEART RATE: 78 BPM | DIASTOLIC BLOOD PRESSURE: 81 MMHG

## 2024-09-27 DIAGNOSIS — M79.672 LEFT FOOT PAIN: Primary | ICD-10-CM

## 2024-09-27 DIAGNOSIS — M79.672 LEFT FOOT PAIN: ICD-10-CM

## 2024-09-27 PROCEDURE — 73630 X-RAY EXAM OF FOOT: CPT | Mod: TC | Performed by: RADIOLOGY

## 2024-09-27 PROCEDURE — 99213 OFFICE O/P EST LOW 20 MIN: CPT | Performed by: GENERAL PRACTICE

## 2024-09-27 ASSESSMENT — PAIN SCALES - GENERAL: PAINLEVEL: MODERATE PAIN (4)

## 2024-09-27 NOTE — NURSING NOTE
"Chief Complaint   Patient presents with    Foot Pain     Initial /81 (BP Location: Left arm, Patient Position: Sitting, Cuff Size: Adult Regular)   Pulse 78   Temp 97.8  F (36.6  C) (Oral)   Resp 16   Ht 1.683 m (5' 6.25\")   Wt 78.3 kg (172 lb 9.6 oz)   SpO2 100%   BMI 27.65 kg/m   Estimated body mass index is 27.65 kg/m  as calculated from the following:    Height as of this encounter: 1.683 m (5' 6.25\").    Weight as of this encounter: 78.3 kg (172 lb 9.6 oz).  BP completed using cuff size regular left arm    Lisa Magill, CMA    "

## 2024-09-27 NOTE — PROGRESS NOTES
"  Assessment & Plan     Left foot pain  Chasing after a child about 3 weeks ago  Still having pain in the left 5th digit, plantar side. Some cold sensation, tenderness with palpation  Possible stress fracture  Weight bearing is ok  - XR Foot Left G/E 3 Views; Future          BMI  Estimated body mass index is 27.65 kg/m  as calculated from the following:    Height as of this encounter: 1.683 m (5' 6.25\").    Weight as of this encounter: 78.3 kg (172 lb 9.6 oz).   Weight management plan: Discussed healthy diet and exercise guidelines          Tyrell Prescott is a 44 year old, presenting for the following health issues:  Foot Pain        9/27/2024    10:12 AM   Additional Questions   Roomed by Lisa Magill, CMA   Accompanied by self         9/27/2024    10:12 AM   Patient Reported Additional Medications   Patient reports taking the following new medications NONE     History of Present Illness       Reason for visit:  I hurt my foot 3 weeks ago and its not improving  Symptom onset:  3-4 weeks ago  Symptoms include:  Numbness, discoloration, pain  Symptom intensity:  Moderate  Symptom progression:  Staying the same  Had these symptoms before:  No  What makes it worse:  Walking  What makes it better:  Elevation   She is taking medications regularly.         Pain History:  When did you first notice your pain? 3 weeks ago   Have you seen anyone else for your pain? No  How has your pain affected your ability to work? 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 Musculoskeletal problem/pain  Onset/Duration: 3 weeks ago  Description  Location: foot-pinky toe area - left  Joint Swelling: No  Redness: YES  Pain: YES  Warmth: No  Intensity:  moderate, 3-4/10  Progression of Symptoms:  worsening and intermittent  Accompanying signs and symptoms:   Fevers: No  Numbness/tingling/weakness: YES- numbness and tingling   History  Trauma to the area: " "YES-chasing child and injured foot.    Recent illness:  No  Previous similar problem: No  Previous evaluation:  No  Precipitating or alleviating factors:  Aggravating factors include: when patient had to bindu after child  Therapies tried and outcome: elevation helped.  But heat did NOT help           Review of Systems  Constitutional, HEENT, cardiovascular, pulmonary, gi and gu systems are negative, except as otherwise noted.      Objective    /81 (BP Location: Left arm, Patient Position: Sitting, Cuff Size: Adult Regular)   Pulse 78   Temp 97.8  F (36.6  C) (Oral)   Resp 16   Ht 1.683 m (5' 6.25\")   Wt 78.3 kg (172 lb 9.6 oz)   SpO2 100%   BMI 27.65 kg/m    Body mass index is 27.65 kg/m .  Physical Exam  Feet:      Comments: Left foot: +DP/PT  Some tenderness with palpation of plantar fifth toe, moving all toes, small clammy are with with discoloration.                    Signed Electronically by: Thao Ford MD    "

## 2024-10-12 SDOH — HEALTH STABILITY: PHYSICAL HEALTH: ON AVERAGE, HOW MANY DAYS PER WEEK DO YOU ENGAGE IN MODERATE TO STRENUOUS EXERCISE (LIKE A BRISK WALK)?: 3 DAYS

## 2024-10-12 SDOH — HEALTH STABILITY: PHYSICAL HEALTH: ON AVERAGE, HOW MANY MINUTES DO YOU ENGAGE IN EXERCISE AT THIS LEVEL?: 20 MIN

## 2024-10-12 ASSESSMENT — SOCIAL DETERMINANTS OF HEALTH (SDOH): HOW OFTEN DO YOU GET TOGETHER WITH FRIENDS OR RELATIVES?: PATIENT DECLINED

## 2024-10-15 ENCOUNTER — OFFICE VISIT (OUTPATIENT)
Dept: FAMILY MEDICINE | Facility: CLINIC | Age: 44
End: 2024-10-15
Payer: OTHER GOVERNMENT

## 2024-10-15 VITALS
HEIGHT: 66 IN | DIASTOLIC BLOOD PRESSURE: 77 MMHG | TEMPERATURE: 97.4 F | RESPIRATION RATE: 16 BRPM | OXYGEN SATURATION: 100 % | HEART RATE: 106 BPM | BODY MASS INDEX: 27.64 KG/M2 | WEIGHT: 172 LBS | SYSTOLIC BLOOD PRESSURE: 113 MMHG

## 2024-10-15 DIAGNOSIS — F41.1 GAD (GENERALIZED ANXIETY DISORDER): ICD-10-CM

## 2024-10-15 DIAGNOSIS — Z12.31 ENCOUNTER FOR SCREENING MAMMOGRAM FOR BREAST CANCER: ICD-10-CM

## 2024-10-15 DIAGNOSIS — K76.0 HEPATIC STEATOSIS: ICD-10-CM

## 2024-10-15 DIAGNOSIS — R53.83 FATIGUE, UNSPECIFIED TYPE: ICD-10-CM

## 2024-10-15 DIAGNOSIS — G43.E09 CHRONIC MIGRAINE WITH AURA WITHOUT STATUS MIGRAINOSUS, NOT INTRACTABLE: ICD-10-CM

## 2024-10-15 DIAGNOSIS — I10 HYPERTENSION, UNSPECIFIED TYPE: ICD-10-CM

## 2024-10-15 DIAGNOSIS — Z00.00 ROUTINE GENERAL MEDICAL EXAMINATION AT A HEALTH CARE FACILITY: Primary | ICD-10-CM

## 2024-10-15 PROBLEM — D25.9 UTERINE LEIOMYOMA: Status: ACTIVE | Noted: 2024-10-15

## 2024-10-15 LAB
CHOLEST SERPL-MCNC: 188 MG/DL
FASTING STATUS PATIENT QL REPORTED: YES
FERRITIN SERPL-MCNC: 130 NG/ML (ref 6–175)
FSH SERPL IRP2-ACNC: 3.9 MIU/ML
HBV CORE AB SERPL QL IA: NONREACTIVE
HBV SURFACE AG SERPL QL IA: NONREACTIVE
HCV AB SERPL QL IA: NONREACTIVE
HDLC SERPL-MCNC: 39 MG/DL
LDLC SERPL CALC-MCNC: 117 MG/DL
NONHDLC SERPL-MCNC: 149 MG/DL
TRIGL SERPL-MCNC: 159 MG/DL

## 2024-10-15 PROCEDURE — 90656 IIV3 VACC NO PRSV 0.5 ML IM: CPT | Performed by: STUDENT IN AN ORGANIZED HEALTH CARE EDUCATION/TRAINING PROGRAM

## 2024-10-15 PROCEDURE — 36415 COLL VENOUS BLD VENIPUNCTURE: CPT | Performed by: STUDENT IN AN ORGANIZED HEALTH CARE EDUCATION/TRAINING PROGRAM

## 2024-10-15 PROCEDURE — 83001 ASSAY OF GONADOTROPIN (FSH): CPT | Performed by: STUDENT IN AN ORGANIZED HEALTH CARE EDUCATION/TRAINING PROGRAM

## 2024-10-15 PROCEDURE — 80061 LIPID PANEL: CPT | Performed by: STUDENT IN AN ORGANIZED HEALTH CARE EDUCATION/TRAINING PROGRAM

## 2024-10-15 PROCEDURE — 90471 IMMUNIZATION ADMIN: CPT | Performed by: STUDENT IN AN ORGANIZED HEALTH CARE EDUCATION/TRAINING PROGRAM

## 2024-10-15 PROCEDURE — 86803 HEPATITIS C AB TEST: CPT | Performed by: STUDENT IN AN ORGANIZED HEALTH CARE EDUCATION/TRAINING PROGRAM

## 2024-10-15 PROCEDURE — 86704 HEP B CORE ANTIBODY TOTAL: CPT | Performed by: STUDENT IN AN ORGANIZED HEALTH CARE EDUCATION/TRAINING PROGRAM

## 2024-10-15 PROCEDURE — 99214 OFFICE O/P EST MOD 30 MIN: CPT | Mod: 25 | Performed by: STUDENT IN AN ORGANIZED HEALTH CARE EDUCATION/TRAINING PROGRAM

## 2024-10-15 PROCEDURE — 87340 HEPATITIS B SURFACE AG IA: CPT | Performed by: STUDENT IN AN ORGANIZED HEALTH CARE EDUCATION/TRAINING PROGRAM

## 2024-10-15 PROCEDURE — 99396 PREV VISIT EST AGE 40-64: CPT | Mod: 25 | Performed by: STUDENT IN AN ORGANIZED HEALTH CARE EDUCATION/TRAINING PROGRAM

## 2024-10-15 PROCEDURE — 82728 ASSAY OF FERRITIN: CPT | Performed by: STUDENT IN AN ORGANIZED HEALTH CARE EDUCATION/TRAINING PROGRAM

## 2024-10-15 RX ORDER — ESCITALOPRAM OXALATE 5 MG/1
5 TABLET ORAL DAILY
Qty: 30 TABLET | Refills: 0 | Status: SHIPPED | OUTPATIENT
Start: 2024-10-15 | End: 2024-11-12

## 2024-10-15 ASSESSMENT — ANXIETY QUESTIONNAIRES
6. BECOMING EASILY ANNOYED OR IRRITABLE: SEVERAL DAYS
3. WORRYING TOO MUCH ABOUT DIFFERENT THINGS: SEVERAL DAYS
5. BEING SO RESTLESS THAT IT IS HARD TO SIT STILL: SEVERAL DAYS
GAD7 TOTAL SCORE: 11
2. NOT BEING ABLE TO STOP OR CONTROL WORRYING: SEVERAL DAYS
IF YOU CHECKED OFF ANY PROBLEMS ON THIS QUESTIONNAIRE, HOW DIFFICULT HAVE THESE PROBLEMS MADE IT FOR YOU TO DO YOUR WORK, TAKE CARE OF THINGS AT HOME, OR GET ALONG WITH OTHER PEOPLE: SOMEWHAT DIFFICULT
7. FEELING AFRAID AS IF SOMETHING AWFUL MIGHT HAPPEN: SEVERAL DAYS
GAD7 TOTAL SCORE: 11
1. FEELING NERVOUS, ANXIOUS, OR ON EDGE: NEARLY EVERY DAY

## 2024-10-15 ASSESSMENT — PATIENT HEALTH QUESTIONNAIRE - PHQ9
SUM OF ALL RESPONSES TO PHQ QUESTIONS 1-9: 6
5. POOR APPETITE OR OVEREATING: NEARLY EVERY DAY

## 2024-10-15 ASSESSMENT — PAIN SCALES - GENERAL: PAINLEVEL: NO PAIN (0)

## 2024-10-15 NOTE — PROGRESS NOTES
Preventive Care Visit  [facility]  Andrew Kirk MD, Family Practice  Oct 15, 2024    Assessment & Plan     Routine general medical examination at a health care facility  Fasting thus will obtain lipid today. Due for mammogram, ordered. UTD on colonoscopy. S/p total vaginal hysterectomy in 2022. Discussed vaccination recommendations.  - Lipid panel reflex to direct LDL Non-fasting    Encounter for screening mammogram for breast cancer  - MA Screen Bilateral w/Jason    Hepatic steatosis  Long standing diagnosis per patient. No significant alcohol use. Fib4 score in 2023 is low risk. Unclear if she has had labs to rule out less common causes. Will obtain today.  - Hepatitis B core antibody  - Hepatitis B surface antigen  - Hepatitis C Screen Reflex to HCV RNA Quant and Genotype  - Ferritin    FLORIDALMA (generalized anxiety disorder)  Prior diagnosis, is worse recently. FLORIDALMA of 11 today. She also describes apathy and fatigue which are likely related to mood. Plan to start escitalopram, describes sensitivity to medication thus will start on low dose, discussed r/b/se. Follow up in 6 weeks to reassess.   - escitalopram (LEXAPRO) 5 MG tablet  Dispense: 30 tablet; Refill: 0    Chronic migraine with aura without status migrainosus, not intractable  Stable, on clonidine for prophylaxis.    Hypertension, unspecified type  Controlled on clonidine. See problem list for more details. Last BMP WNL around 4/2024. Ok to refill for 1 yr.     Fatigue, unspecified type  For several months. Non specific. Likely related to mood, as above. Per patient request, will obtain FSH to rule out menopause as patient unable to determine through menstrual cycle (s/p hysterectomy). If not improving, can consider adding CBC, TSH  - Follicle stimulating hormone    Counseling  Appropriate preventive services were discussed with this patient.  Checklist reviewing preventive services available has been given to the patient.    Follow up  in 6 weeks for mood reassessment    [facility] MD  [facility]  10/15/2024      Subjective   Genie is a 44 year old, presenting for the following:  Physical    Menopausal?  S/p hysterectomy in 2022.   Mother with hysterectomy in early 50's.  Noticing apathy and fatigue starting over the summer.  No hot flashes. Wonders if this is menopause.    Mood  Has never had depression in the past but mother with extreme depression.  Feels apathy about everything. Not grumpy or anything.  Feeling a lot of anxiety though.  Has been on zoloft in the past.   Lately has been really tired and cold. Cold all the time.   No constipation at all. No SOB.         10/15/2024    10:12 AM   Additional Questions   Roomed by allyssa dooley   Accompanied by self         10/15/2024    10:12 AM   Patient Reported Additional Medications   Patient reports taking the following new medications na        Health Care Directive  Patient does not have a Health Care Directive or Living Will: Discussed advance care planning with patient; however, patient declined at this time.    HPI        10/12/2024   General Health   How would you rate your overall physical health? Good   Feel stress (tense, anxious, or unable to sleep) Very much      (!) STRESS CONCERN      10/12/2024   Nutrition   Three or more servings of calcium each day? (!) I DON'T KNOW   Diet: Low salt    Other   If other, please elaborate: I'm allergic to nuts and sensitive to soy   How many servings of fruit and vegetables per day? (!) 2-3   How many sweetened beverages each day? 0-1       Multiple values from one day are sorted in reverse-chronological order         10/12/2024   Exercise   Days per week of moderate/strenous exercise 3 days   Average minutes spent exercising at this level 20 min            10/12/2024   Social Factors   Frequency of gathering with friends or relatives Patient declined   Worry food won't last until get money to buy more No   Food not last or not have  enough money for food? No   Do you have housing? (Housing is defined as stable permanent housing and does not include staying ouside in a car, in a tent, in an abandoned building, in an overnight shelter, or couch-surfing.) Yes   Are you worried about losing your housing? No   Lack of transportation? No   Unable to get utilities (heat,electricity)? No            10/12/2024   Dental   Dentist two times every year? Yes            10/12/2024   TB Screening   Were you born outside of the [place]? No      Today's PHQ-2 Score:       6/21/2024     1:40 PM   PHQ-2 ( 1999 [employer])   Q1: Little interest or pleasure in doing things 0   Q2: Feeling down, depressed or hopeless 0   PHQ-2 Score 0         10/12/2024   Substance Use   Alcohol more than 3/day or more than 7/wk Not Applicable   Do you use any other substances recreationally? No        Social History     Tobacco Use    Smoking status: Never    Smokeless tobacco: Never   Vaping Use    Vaping status: Never Used   Substance Use Topics    Alcohol use: Not Currently    Drug use: Never         12/1/2023   LAST FHS-7 RESULTS   1st degree relative breast or ovarian cancer No   Any relative bilateral breast cancer No   Any male have breast cancer No   Any ONE woman have BOTH breast AND ovarian cancer No   Any woman with breast cancer before 50yrs No   2 or more relatives with breast AND/OR ovarian cancer No   2 or more relatives with breast AND/OR bowel cancer No      Mammogram Screening - Mammogram every 1-2 years updated in Health Maintenance based on mutual decision making          10/12/2024   One time HIV Screening   Previous HIV test? Yes          10/12/2024   STI Screening   New sexual partner(s) since last STI/HIV test? No      History of abnormal Pap smear: Status post hysterectomy with removal of cervix in 2022 and patient reports hx of abnormal pap in past requiring colposcopy and maybe biopsy?? about 16 years ago but all pap smears since then have been normal. Regardless  "of this history, she declines repeat pap anyway. Health Maintenance and Surgical History updated. colposcopy,        ASCVD Risk   The ASCVD Risk score (Live CRUZ, et al., 2019) failed to calculate for the following reasons:    Cannot find a previous HDL lab    Cannot find a previous total cholesterol lab    Reviewed and updated as needed this visit by Provider   Tobacco  Allergies  Meds  Problems  Med Hx  Surg Hx  Fam Hx               Objective    Exam  /77   Pulse 106   Temp 97.4  F (36.3  C) (Oral)   Resp 16   Ht 1.683 m (5' 6.25\")   Wt 78 kg (172 lb)   SpO2 100%   BMI 27.55 kg/m     Estimated body mass index is 27.55 kg/m  as calculated from the following:    Height as of this encounter: 1.683 m (5' 6.25\").    Weight as of this encounter: 78 kg (172 lb).    Physical Exam  GENERAL: healthy, alert and no distress  HEAD: Normocephalic, atraumatic.   EYES: PERRL. Normal conjunctivae, sclera.   ENT: Normal EAC and TMs bilaterally. Normal oropharynx.   NECK: Supple. No lymphadenopathy appreciated. Trachea midline. Thyroid not enlarged, not TTP.  RESP: lungs clear to auscultation - no rales, rhonchi or wheezes  CV: regular rate and rhythm, normal S1 S2, no murmur, click, rub or gallop.  No peripheral swelling noted.   ABDOMEN: soft, no TTP x4 quadrants. No hepatomegaly or masses appreciated. BS normactive.  MSK: no gross musculoskeletal defects noted.  SKIN: no suspicious lesions or rashes.  EXT: Warm and well perfused.   NEURO: CNII-XII grossly intact. No focal deficits.  PSYCH: Groomed, dressed appropriately for weather. Normal mood with consistent affect.     Signed Electronically by: Andrew Kirk MD    "

## 2024-10-15 NOTE — PATIENT INSTRUCTIONS
Patient Education   Preventive Care Advice   This is general advice given by our system to help you stay healthy. However, your care team may have specific advice just for you. Please talk to your care team about your preventive care needs.  Nutrition  Eat 5 or more servings of fruits and vegetables each day.  Try wheat bread, brown rice and whole grain pasta (instead of white bread, rice, and pasta).  Get enough calcium and vitamin D. Check the label on foods and aim for 100% of the RDA (recommended daily allowance).  Lifestyle  Exercise at least 150 minutes each week  (30 minutes a day, 5 days a week).  Do muscle strengthening activities 2 days a week. These help control your weight and prevent disease.  No smoking.  Wear sunscreen to prevent skin cancer.  Have a dental exam and cleaning every 6 months.  Yearly exams  See your health care team every year to talk about:  Any changes in your health.  Any medicines your care team has prescribed.  Preventive care, family planning, and ways to prevent chronic diseases.  Shots (vaccines)   HPV shots (up to age 26), if you've never had them before.  Hepatitis B shots (up to age 59), if you've never had them before.  COVID-19 shot: Get this shot when it's due.  Flu shot: Get a flu shot every year.  Tetanus shot: Get a tetanus shot every 10 years.  Pneumococcal, hepatitis A, and RSV shots: Ask your care team if you need these based on your risk.  Shingles shot (for age 50 and up)  General health tests  Diabetes screening:  Starting at age 35, Get screened for diabetes at least every 3 years.  If you are younger than age 35, ask your care team if you should be screened for diabetes.  Cholesterol test: At age 39, start having a cholesterol test every 5 years, or more often if advised.  Bone density scan (DEXA): At age 50, ask your care team if you should have this scan for osteoporosis (brittle bones).  Hepatitis C: Get tested at least once in your life.  STIs (sexually  transmitted infections)  Before age 24: Ask your care team if you should be screened for STIs.  After age 24: Get screened for STIs if you're at risk. You are at risk for STIs (including HIV) if:  You are sexually active with more than one person.  You don't use condoms every time.  You or a partner was diagnosed with a sexually transmitted infection.  If you are at risk for HIV, ask about PrEP medicine to prevent HIV.  Get tested for HIV at least once in your life, whether you are at risk for HIV or not.  Cancer screening tests  Cervical cancer screening: If you have a cervix, begin getting regular cervical cancer screening tests starting at age 21.  Breast cancer scan (mammogram): If you've ever had breasts, begin having regular mammograms starting at age 40. This is a scan to check for breast cancer.  Colon cancer screening: It is important to start screening for colon cancer at age 45.  Have a colonoscopy test every 10 years (or more often if you're at risk) Or, ask your provider about stool tests like a FIT test every year or Cologuard test every 3 years.  To learn more about your testing options, visit:   .  For help making a decision, visit:   https://bit.ly/bv03634.  Prostate cancer screening test: If you have a prostate, ask your care team if a prostate cancer screening test (PSA) at age 55 is right for you.  Lung cancer screening: If you are a current or former smoker ages 50 to 80, ask your care team if ongoing lung cancer screenings are right for you.  For informational purposes only. Not to replace the advice of your health care provider. Copyright   2023 [facility]. All rights reserved. Clinically reviewed by the [facility] Transitions Program. [employer] 279187 - REV 01/24.  Learning About Stress  What is stress?     Stress is your body's response to a hard situation. Your body can have a physical, emotional, or mental response. Stress is a fact of life for most people, and it  affects everyone differently. What causes stress for you may not be stressful for someone else.  A lot of things can cause stress. You may feel stress when you go on a job interview, take a test, or run a race. This kind of short-term stress is normal and even useful. It can help you if you need to work hard or react quickly. For example, stress can help you finish an important job on time.  Long-term stress is caused by ongoing stressful situations or events. Examples of long-term stress include long-term health problems, ongoing problems at work, or conflicts in your family. Long-term stress can harm your health.  How does stress affect your health?  When you are stressed, your body responds as though you are in danger. It makes hormones that speed up your heart, make you breathe faster, and give you a burst of energy. This is called the fight-or-flight stress response. If the stress is over quickly, your body goes back to normal and no harm is done.  But if stress happens too often or lasts too long, it can have bad effects. Long-term stress can make you more likely to get sick, and it can make symptoms of some diseases worse. If you tense up when you are stressed, you may develop neck, shoulder, or low back pain. Stress is linked to high blood pressure and heart disease.  Stress also harms your emotional health. It can make you mahajan, tense, or depressed. Your relationships may suffer, and you may not do well at work or school.  What can you do to manage stress?  You can try these things to help manage stress:   Do something active. Exercise or activity can help reduce stress. Walking is a great way to get started. Even everyday activities such as housecleaning or yard work can help.  Try yoga or raad chi. These techniques combine exercise and meditation. You may need some training at first to learn them.  Do something you enjoy. For example, listen to music or go to a movie. Practice your hobby or do volunteer  "work.  Meditate. This can help you relax, because you are not worrying about what happened before or what may happen in the future.  Do guided imagery. Imagine yourself in any setting that helps you feel calm. You can use online videos, books, or a teacher to guide you.  Do breathing exercises. For example:  From a standing position, bend forward from the waist with your knees slightly bent. Let your arms dangle close to the floor.  Breathe in slowly and deeply as you return to a standing position. Roll up slowly and lift your head last.  Hold your breath for just a few seconds in the standing position.  Breathe out slowly and bend forward from the waist.  Let your feelings out. Talk, laugh, cry, and express anger when you need to. Talking with supportive friends or family, a counselor, or a lora leader about your feelings is a healthy way to relieve stress. Avoid discussing your feelings with people who make you feel worse.  Write. It may help to write about things that are bothering you. This helps you find out how much stress you feel and what is causing it. When you know this, you can find better ways to cope.  What can you do to prevent stress?  You might try some of these things to help prevent stress:  Manage your time. This helps you find time to do the things you want and need to do.  Get enough sleep. Your body recovers from the stresses of the day while you are sleeping.  Get support. Your family, friends, and community can make a difference in how you experience stress.  Limit your news feed. Avoid or limit time on social media or news that may make you feel stressed.  Do something active. Exercise or activity can help reduce stress. Walking is a great way to get started.  Where can you learn more?  Go to https://www.[place].net/patiented  Enter N032 in the search box to learn more about \"Learning About Stress.\"  Current as of: October 24, 2023  Content Version: 14.2 2024 [employer]. "   Care instructions adapted under license by your healthcare professional. If you have questions about a medical condition or this instruction, always ask your healthcare professional. Healthwise, [facility] disclaims any warranty or liability for your use of this information.    Learning About Depression Screening  What is depression screening?  Depression screening is a way to see if you have depression symptoms. It may be done by a doctor or counselor. It's often part of a routine checkup. That's because your mental health is just as important as your physical health.  Depression is a mental health condition that affects how you feel, think, and act. You may:  Have less energy.  Lose interest in your daily activities.  Feel sad and grouchy for a long time.  Depression is very common. It affects people of all ages.  Many things can lead to depression. Some people become depressed after they have a stroke or find out they have a major illness like cancer or heart disease. The death of a loved one or a breakup may lead to depression. It can run in families. Most experts believe that a combination of inherited genes and stressful life events can cause it.  What happens during screening?  You may be asked to fill out a form about your depression symptoms. You and the doctor will discuss your answers. The doctor may ask you more questions to learn more about how you think, act, and feel.  What happens after screening?  If you have symptoms of depression, your doctor will talk to you about your options.  Doctors usually treat depression with medicines or counseling. Often, combining the two works best. Many people don't get help because they think that they'll get over the depression on their own. But people with depression may not get better unless they get treatment.  The cause of depression is not well understood. There may be many factors involved. But if you have depression, it's not your fault.  A serious  "symptom of depression is thinking about death or suicide. If you or someone you care about talks about this or about feeling hopeless, get help right away.  It's important to know that depression can be treated. Medicine, counseling, and self-care may help.  Where can you learn more?  Go to https://www.[employer].net/patiented  Enter T185 in the search box to learn more about \"Learning About Depression Screening.\"  Current as of: June 24, 2023  Content Version: 14.2 2024 [facility].   Care instructions adapted under license by your healthcare professional. If you have questions about a medical condition or this instruction, always ask your healthcare professional. [employer] disclaims any warranty or liability for your use of this information.       "

## 2024-10-16 ENCOUNTER — PATIENT OUTREACH (OUTPATIENT)
Dept: CARE COORDINATION | Facility: CLINIC | Age: 44
End: 2024-10-16
Payer: OTHER GOVERNMENT

## 2024-11-12 DIAGNOSIS — F41.1 GAD (GENERALIZED ANXIETY DISORDER): ICD-10-CM

## 2024-11-12 RX ORDER — ESCITALOPRAM OXALATE 5 MG/1
5 TABLET ORAL DAILY
Qty: 30 TABLET | Refills: 0 | Status: SHIPPED | OUTPATIENT
Start: 2024-11-12

## 2024-12-16 ENCOUNTER — NURSE TRIAGE (OUTPATIENT)
Dept: FAMILY MEDICINE | Facility: CLINIC | Age: 44
End: 2024-12-16
Payer: OTHER GOVERNMENT

## 2024-12-16 NOTE — TELEPHONE ENCOUNTER
Nurse Triage SBAR    Is this a 2nd Level Triage? NO    Situation: lisa on neck x3 weeks- calling for apt    Background: Pt has had a rash on her neck x3 weeks and left eyelid peeling x3 weeks.      Assessment: bright red spot on the center of pt neck. Intermittent itching- pt thinks it itches when the lotion is soaked in and needs another application. No bumps. States it feels dry and rough. Pt also reporting peeling skin on her left eyelid that started around the same time.     Protocol Recommended Disposition:   See in Office Within 3 Days    Recommendation: scheduled apt tomorrow. Advised pt on home care per protocol. Advised pt to call back with new or worsening sx.  Pt verbally agrees with plan.     Destiny Ballard RN     Reason for Disposition   Localized rash present > 7 days    Additional Information   Negative: Sounds like a life-threatening emergency to the triager   Negative: Athlete's Foot suspected (i.e., itchy rash between the toes)   Negative: Insect bite(s) suspected   Negative: Jock Itch suspected (i.e., itchy rash on inner thighs near genital area)   Negative: Localized lump (or swelling) without redness or rash   Negative: MPOX SUSPECTED (e.g., direct skin contact such as sex, recent travel to [place] or [place]) and any SYMPTOMS OF MPOX (e.g., rash, fever, muscle aches, or swollen lymph nodes)   Negative: AT RISK FOR MPOX (men-who-have-sex-with-men) and POSSIBLE EXPOSURE (e.g., multiple sex partners in past 21 days) and ANY SYMPTOMS OF MPOX (e.g., rash, fever, muscle aches, or swollen lymph nodes)   Negative: Poison ivy, oak, or sumac rash suspected (e.g., itchy rash after contact with poison ivy)   Negative: Rash of female genital area (e.g., labia, vagina, vulva)   Negative: Rash of male genital area (e.g., penis, scrotum)   Negative: Redness of immunization site   Negative: Shingles suspected (i.e., painful rash, multiple small blisters grouped together in one area of body; dermatomal  "distribution)   Negative: Small spot, skin growth, or mole   Negative: Wound infection suspected (i.e., pain, spreading redness, or pus; in a cut, puncture, scrape or sutured wound)   Negative: Fever and localized purple or blood-colored spots or dots that are not from injury or friction   Negative: Fever and localized rash is very painful   Negative: Patient sounds very sick or weak to the triager   Negative: Looks like a boil, infected sore, deep ulcer, or other infected rash (spreading redness, pus)   Negative: Painful rash with multiple small blisters grouped together (i.e., dermatomal distribution or 'band' or 'stripe')   Negative: Localized rash is very painful (no fever)   Negative: Localized purple or blood-colored spots or dots that are not from injury or friction (no fever)   Negative: Lyme disease suspected (e.g., bull's-eye rash or tick bite / exposure)   Negative: Tender bumps in armpits   Negative: Pimples (localized) and no improvement after using CARE ADVICE   Negative: SEVERE local itching persists after 2 days of steroid cream   Negative: Applying cream or ointment and it causes severe itch, burning, or pain   Negative: Medication patch causing local rash or itching   Negative: Patient wants to be seen    Answer Assessment - Initial Assessment Questions  1. APPEARANCE of RASH: \"Describe the rash.\"       Bright red, 3 in x 3 in   Feels rough/dry- not bumpy     2. LOCATION: \"Where is the rash located?\"       Center of neck   Left eyelid peeling     5. ONSET: \"When did the rash start?\"       3 weeks ago for neck rash  3 weeks ago for eye peeling   Around thanksgiving     6. ITCHING: \"Does the rash itch?\" If Yes, ask: \"How bad is the itch?\"  (Scale 0-10; or none, mild, moderate, severe)      Neck itches when moisturizer is absorbed    7. PAIN: \"Does the rash hurt?\" If Yes, ask: \"How bad is the pain?\"  (Scale 0-10; or none, mild, moderate, severe)      Sore to touch 2-3/10     8. OTHER SYMPTOMS: \"Do you " "have any other symptoms?\" (e.g., fever)      None    Protocols used: Rash or Redness - [id number]    "

## 2024-12-17 ENCOUNTER — OFFICE VISIT (OUTPATIENT)
Dept: FAMILY MEDICINE | Facility: CLINIC | Age: 44
End: 2024-12-17
Payer: OTHER GOVERNMENT

## 2024-12-17 VITALS
WEIGHT: 172.8 LBS | HEART RATE: 85 BPM | DIASTOLIC BLOOD PRESSURE: 81 MMHG | RESPIRATION RATE: 16 BRPM | OXYGEN SATURATION: 100 % | TEMPERATURE: 97.4 F | SYSTOLIC BLOOD PRESSURE: 126 MMHG | HEIGHT: 65 IN | BODY MASS INDEX: 28.79 KG/M2

## 2024-12-17 DIAGNOSIS — R21 RASH AND NONSPECIFIC SKIN ERUPTION: ICD-10-CM

## 2024-12-17 DIAGNOSIS — Z11.4 SCREENING FOR HIV (HUMAN IMMUNODEFICIENCY VIRUS): Primary | ICD-10-CM

## 2024-12-17 RX ORDER — TRIAMCINOLONE ACETONIDE 1 MG/G
CREAM TOPICAL 2 TIMES DAILY PRN
Qty: 45 G | Refills: 1 | Status: SHIPPED | OUTPATIENT
Start: 2024-12-17 | End: 2024-12-31

## 2024-12-17 RX ORDER — FEXOFENADINE HCL 180 MG/1
180 TABLET ORAL DAILY
Qty: 30 TABLET | Refills: 0 | Status: SHIPPED | OUTPATIENT
Start: 2024-12-17

## 2024-12-17 ASSESSMENT — PAIN SCALES - GENERAL: PAINLEVEL_OUTOF10: NO PAIN (0)

## 2024-12-17 NOTE — PROGRESS NOTES
"  {PROVIDER CHARTING PREFERENCE:515663}    Subjective   Genie is a 44 year old, presenting for the following health issues:  Rash (On neck )        12/17/2024     1:11 PM   Additional Questions   Roomed by Jessica ALLISON MA   Accompanied by self         12/17/2024     1:11 PM   Patient Reported Additional Medications   Patient reports taking the following new medications none       Rash    A small red dot on the center of the neck 3 weeks ago.  Rash is on the chest, some on the right elbow. No rash in the back, legs.   Also had some skin peeling on the left upper eyelid for the past 3 weeks. Watery left eye for the past 3 weeks. Has been using vaseline for the eyelid.   Was wearing a scarve and stopped wearing a scarve for 3 weeks without improvement.   Has been using OTC cortisone for the rash.   Has been on doxy for 9 months.   No new joint pain, fever, no new products, no recent travel.     Still sleeping ok.     Pruritus was bad today.           History of Present Illness       Reason for visit:  Rash that is spreading  Symptom onset:  3-4 weeks ago  Symptoms include:  Red itchy skin  Symptom intensity:  Moderate  Symptom progression:  Staying the same  Had these symptoms before:  No  What makes it worse:  No  What makes it better:  Lotion, cortisone cream, ice pack   She is taking medications regularly.       {MA/LPN/RN Pre-Provider Visit Orders- hCG/UA/Strep (Optional):297299}  {SUPERLIST (Optional):477837}  {additonal problems for provider to add (Optional):922721}    {ROS Picklists (Optional):084252}      Objective    /81 (BP Location: Right arm, Patient Position: Sitting, Cuff Size: Adult Large)   Pulse 85   Temp 97.4  F (36.3  C) (Oral)   Resp 16   Ht 1.651 m (5' 5\")   Wt 78.4 kg (172 lb 12.8 oz)   SpO2 100%   BMI 28.76 kg/m    Body mass index is 28.76 kg/m .  Physical Exam   {Exam List (Optional):357699}    {Diagnostic Test Results (Optional):083013}        Signed Electronically by: Thao Ford, " MD  {Email feedback regarding this note to [email or web address]   :[id number]}

## 2025-02-25 ENCOUNTER — NURSE TRIAGE (OUTPATIENT)
Dept: FAMILY MEDICINE | Facility: CLINIC | Age: 45
End: 2025-02-25
Payer: COMMERCIAL

## 2025-02-25 NOTE — TELEPHONE ENCOUNTER
"S: Hand Pain in Right Knuckle  B: Patient states pain started on Saturday. Denies any recent hand injury.  A: Patient states that the area is painful. She notes it was red and hot to the touch as well as swollen on Saturday. She states is is painful now but no longer swollen or red. Patient states she will have moderate pain when she wakes up but will improve throughout the day. Patient does note some tingling in her middle and ring finger.   R: See in clinic within 3 days. Scheduled patient with PCP. Patient agreeable to plan.    Next 5 appointments (look out 90 days)      Feb 26, 2025 1:00 PM  (Arrive by 12:40 PM)  Provider Visit with Andrew Kirk MD  [facility] ([facility] [id number] [facility] [address] [phone]            Reason for Disposition   MODERATE pain (e.g., interferes with normal activities) and present > 3 days    Answer Assessment - Initial Assessment Questions  1. ONSET: \"When did the pain start?\"      Saturday  2. LOCATION: \"Where is the pain located?\"      Middle knuckle on Right Hand  3. PAIN: \"How bad is the pain?\" (Scale 1-10; or mild, moderate, severe)      Moderate when she wakes up but improves throughout the day  4. WORK OR EXERCISE: \"Has there been any recent work or exercise that involved this part (i.e., hand or wrist) of the body?\"      No  5. CAUSE: \"What do you think is causing the pain?\"      Patient first thought arthritis   6. AGGRAVATING FACTORS: \"What makes the pain worse?\" (e.g., using computer)      Straightening my finger or putting pressure on that area  7. OTHER SYMPTOMS: \"Do you have any other symptoms?\" (e.g., fever, neck pain, numbness or tingling, rash, swelling)      Numbness and tingling in middle finger and ring finger  8. PREGNANCY: \"Is there any chance you are pregnant?\" \"When was your last menstrual period?\"      No, Had a hysterectomy    Protocols used: Hand Pain-A-[place]  Alisha REED RN, " BSN  Clinic RN  [facility]

## 2025-02-26 ENCOUNTER — OFFICE VISIT (OUTPATIENT)
Dept: FAMILY MEDICINE | Facility: CLINIC | Age: 45
End: 2025-02-26
Payer: COMMERCIAL

## 2025-02-26 ENCOUNTER — ANCILLARY PROCEDURE (OUTPATIENT)
Dept: GENERAL RADIOLOGY | Facility: CLINIC | Age: 45
End: 2025-02-26
Attending: STUDENT IN AN ORGANIZED HEALTH CARE EDUCATION/TRAINING PROGRAM
Payer: COMMERCIAL

## 2025-02-26 VITALS
OXYGEN SATURATION: 100 % | HEART RATE: 93 BPM | DIASTOLIC BLOOD PRESSURE: 87 MMHG | RESPIRATION RATE: 16 BRPM | HEIGHT: 65 IN | TEMPERATURE: 97.4 F | WEIGHT: 172 LBS | BODY MASS INDEX: 28.66 KG/M2 | SYSTOLIC BLOOD PRESSURE: 131 MMHG

## 2025-02-26 DIAGNOSIS — M25.541 METACARPOPHALANGEAL JOINT PAIN OF RIGHT HAND: ICD-10-CM

## 2025-02-26 DIAGNOSIS — M25.541 METACARPOPHALANGEAL JOINT PAIN OF RIGHT HAND: Primary | ICD-10-CM

## 2025-02-26 DIAGNOSIS — I73.00 RAYNAUD'S DISEASE WITHOUT GANGRENE: ICD-10-CM

## 2025-02-26 PROCEDURE — 3075F SYST BP GE 130 - 139MM HG: CPT | Performed by: STUDENT IN AN ORGANIZED HEALTH CARE EDUCATION/TRAINING PROGRAM

## 2025-02-26 PROCEDURE — 1125F AMNT PAIN NOTED PAIN PRSNT: CPT | Performed by: STUDENT IN AN ORGANIZED HEALTH CARE EDUCATION/TRAINING PROGRAM

## 2025-02-26 PROCEDURE — 73130 X-RAY EXAM OF HAND: CPT | Mod: TC | Performed by: RADIOLOGY

## 2025-02-26 PROCEDURE — 3079F DIAST BP 80-89 MM HG: CPT | Performed by: STUDENT IN AN ORGANIZED HEALTH CARE EDUCATION/TRAINING PROGRAM

## 2025-02-26 PROCEDURE — 99214 OFFICE O/P EST MOD 30 MIN: CPT | Performed by: STUDENT IN AN ORGANIZED HEALTH CARE EDUCATION/TRAINING PROGRAM

## 2025-02-26 ASSESSMENT — PAIN SCALES - GENERAL: PAINLEVEL_OUTOF10: MODERATE PAIN (5)

## 2025-02-26 NOTE — PROGRESS NOTES
Assessment & Plan     Metacarpophalangeal joint pain of right hand  Raynaud's disease without gangrene  Largely limited to one MCP joint, improving overall. Hx of one prior episode of different MCP swelling lasting approx 1 hour prior to completely resolving.  No obvious effusion on exam although is now day 4 of sxs. Does have hx of Raynaud's phenomenon. Plan to start with XR, consider adding labs as necessary including uric acid, ESR/CRP, ROLANDA, RF, CCP. In meantime, ice/NSAIDs/topicals/activity modification.   - XR Hand Right G/E 3 Views    Follow up pending imaging/lab results    ADDENDUM:  XR with mild soft tissue swelling overlying dorsal aspect of MCPs thus will obtain lab work as above  - ESR/CRP  - ROLANDA  - Rheumatoid factor  - anti CCP  - uric acid    Andrew Kirk MD  [facility]  2/27/2025    Tyrell Prescott is a 44 year old, presenting for the following health issues:  Hand Pain (RHD)        2/26/2025    12:47 PM   Additional Questions   Roomed by brandon     History of Present Illness       Reason for visit:  Hand injuru  Symptom onset:  3-7 days ago  Symptoms include:  Right knuckle pain, cant extend finger, cant use finger  Symptom intensity:  Moderate  Symptom progression:  Staying the same  Had these symptoms before:  No  What makes it worse:  Using my hand  What makes it better:  Not using the hanf   She is taking medications regularly.     Pain History:  When did you first notice your pain? Saturday   Have you seen anyone else for your pain? No  How has your pain affected your ability to work? Not applicable  Where in your body do you have pain? Musculoskeletal problem/pain  Onset/Duration: 5 days  Description  Location: hand - right  Joint Swelling: YES  Redness: YES  Pain: YES  Warmth: No  Intensity:  severe  Progression of Symptoms:  worsening  Accompanying signs and symptoms:   Fevers: No  Numbness/tingling/weakness: YES  History  Trauma to the area: No  Recent illness:   "No  Previous similar problem: No  Previous evaluation:  No  Precipitating or alleviating factors:  Aggravating factors include: overuse and flex and extension, using hand, typing  Therapies tried and outcome: rest/inactivity, ice, massage, stretching, acetaminophen, and Ibuprofen    2001 - dog bite to R 2nd knuckle    R knuckle pain  Started on Saturday, 4 days ago.  No known trauma to this hand.   Was swollen, hot and painful at that time - whole hand.   Some tingling/numbness in R middle finger with zingers up the back of the hand.   Doesn't remember what precipitated this episode at all.   Does work with a lot of reading and typing though.   Since then, she took a 'boat load' of ibuprofen and acetaminophen.   Overall improving.  No other issues in other joints. No problems in foot joints.  No hx of gout at all.   Had an episodes about 4 months in the R 2nd MCP upon waking up but only lasted for about an hour.         Objective    /87   Pulse 93   Temp 97.4  F (36.3  C)   Resp 16   Ht 1.651 m (5' 5\")   Wt 78 kg (172 lb)   SpO2 100%   BMI 28.62 kg/m    Body mass index is 28.62 kg/m .    Physical Exam   GENERAL: healthy, alert and no distress  HEAD: Normocephalic, atraumatic.   EYES: Normal conjunctivae, sclera.   RESP: Normal respiratory effort.  MSK: HANDS: R 2nd and 3rd MCP larger than L 2nd and 3rd MCP. TTP over R 3rd MCP. No obvious effusion, warmth erythema. Pain with finger flexion and extension. Diminished sensation to light touch in 3rd finger only.   SKIN: no suspicious lesions or rashes.  NEURO: CNII-XII grossly intact. No focal deficits.  PSYCH: Groomed, dressed appropriately for weather. Normal mood with consistent affect.     Signed Electronically by: Andrew Kirk MD  "

## 2025-02-28 ENCOUNTER — LAB (OUTPATIENT)
Dept: LAB | Facility: CLINIC | Age: 45
End: 2025-02-28
Payer: COMMERCIAL

## 2025-02-28 DIAGNOSIS — Z11.4 SCREENING FOR HIV (HUMAN IMMUNODEFICIENCY VIRUS): Primary | ICD-10-CM

## 2025-03-03 LAB — ANA SER QL IF: NEGATIVE

## 2025-03-04 LAB — CCP AB SER IA-ACNC: 1.5 U/ML

## 2025-03-08 DIAGNOSIS — G43.E09 CHRONIC MIGRAINE WITH AURA WITHOUT STATUS MIGRAINOSUS, NOT INTRACTABLE: ICD-10-CM

## 2025-03-10 RX ORDER — CLONIDINE HYDROCHLORIDE 0.2 MG/1
0.2 TABLET ORAL 2 TIMES DAILY
Qty: 180 TABLET | Refills: 2 | Status: SHIPPED | OUTPATIENT
Start: 2025-03-10

## (undated) RX ORDER — DIPHENHYDRAMINE HYDROCHLORIDE 50 MG/ML
INJECTION INTRAMUSCULAR; INTRAVENOUS
Status: DISPENSED
Start: 2024-07-30

## (undated) RX ORDER — FENTANYL CITRATE 50 UG/ML
INJECTION, SOLUTION INTRAMUSCULAR; INTRAVENOUS
Status: DISPENSED
Start: 2024-07-30